# Patient Record
Sex: FEMALE | Race: WHITE | NOT HISPANIC OR LATINO | Employment: PART TIME | URBAN - METROPOLITAN AREA
[De-identification: names, ages, dates, MRNs, and addresses within clinical notes are randomized per-mention and may not be internally consistent; named-entity substitution may affect disease eponyms.]

---

## 2017-06-21 ENCOUNTER — RADIANT APPOINTMENT (OUTPATIENT)
Dept: MAMMOGRAPHY | Facility: CLINIC | Age: 42
End: 2017-06-21
Payer: COMMERCIAL

## 2017-06-21 ENCOUNTER — OFFICE VISIT (OUTPATIENT)
Dept: OBGYN | Facility: CLINIC | Age: 42
End: 2017-06-21
Payer: COMMERCIAL

## 2017-06-21 VITALS
HEIGHT: 63 IN | WEIGHT: 159 LBS | DIASTOLIC BLOOD PRESSURE: 70 MMHG | BODY MASS INDEX: 28.17 KG/M2 | SYSTOLIC BLOOD PRESSURE: 110 MMHG

## 2017-06-21 DIAGNOSIS — Z01.419 ENCOUNTER FOR GYNECOLOGICAL EXAMINATION WITHOUT ABNORMAL FINDING: Primary | ICD-10-CM

## 2017-06-21 DIAGNOSIS — E78.5 HYPERLIPIDEMIA, UNSPECIFIED HYPERLIPIDEMIA TYPE: ICD-10-CM

## 2017-06-21 DIAGNOSIS — M12.80 HLA-B27 POSITIVE ARTHROPATHY: ICD-10-CM

## 2017-06-21 DIAGNOSIS — M47.9 OSTEOARTHRITIS OF SPINE, UNSPECIFIED SPINAL OSTEOARTHRITIS COMPLICATION STATUS, UNSPECIFIED SPINAL REGION: ICD-10-CM

## 2017-06-21 DIAGNOSIS — N39.3 STRESS INCONTINENCE IN FEMALE: ICD-10-CM

## 2017-06-21 DIAGNOSIS — Z30.41 USES ORAL CONTRACEPTION: ICD-10-CM

## 2017-06-21 DIAGNOSIS — Z12.31 VISIT FOR SCREENING MAMMOGRAM: ICD-10-CM

## 2017-06-21 PROCEDURE — 77063 BREAST TOMOSYNTHESIS BI: CPT | Mod: TC

## 2017-06-21 PROCEDURE — G0202 SCR MAMMO BI INCL CAD: HCPCS | Mod: TC

## 2017-06-21 PROCEDURE — 99396 PREV VISIT EST AGE 40-64: CPT | Performed by: OBSTETRICS & GYNECOLOGY

## 2017-06-21 RX ORDER — NORETHINDRONE ACETATE AND ETHINYL ESTRADIOL 1.5-30(21)
1 KIT ORAL DAILY
Qty: 84 TABLET | Refills: 4 | Status: SHIPPED | OUTPATIENT
Start: 2017-06-21 | End: 2017-12-08

## 2017-06-21 ASSESSMENT — ANXIETY QUESTIONNAIRES
IF YOU CHECKED OFF ANY PROBLEMS ON THIS QUESTIONNAIRE, HOW DIFFICULT HAVE THESE PROBLEMS MADE IT FOR YOU TO DO YOUR WORK, TAKE CARE OF THINGS AT HOME, OR GET ALONG WITH OTHER PEOPLE: NOT DIFFICULT AT ALL
1. FEELING NERVOUS, ANXIOUS, OR ON EDGE: NOT AT ALL
7. FEELING AFRAID AS IF SOMETHING AWFUL MIGHT HAPPEN: NOT AT ALL
2. NOT BEING ABLE TO STOP OR CONTROL WORRYING: NOT AT ALL
6. BECOMING EASILY ANNOYED OR IRRITABLE: NOT AT ALL
3. WORRYING TOO MUCH ABOUT DIFFERENT THINGS: NOT AT ALL
5. BEING SO RESTLESS THAT IT IS HARD TO SIT STILL: NOT AT ALL
GAD7 TOTAL SCORE: 0

## 2017-06-21 ASSESSMENT — PATIENT HEALTH QUESTIONNAIRE - PHQ9: 5. POOR APPETITE OR OVEREATING: NOT AT ALL

## 2017-06-21 NOTE — PROGRESS NOTES
Eliza is a 42 year old  female who presents for annual exam.     Besides routine health maintenance,  she would like to discuss iud.    HPI:  The patient's PCP is Anita Ortez    Doing well this year. Still deals with a lot of joint and muscle pain from her autoimmune arthritis, OA and spondylosis. Finally got on a program through a chiropractor office and has lost 15# and doing some sort of supplement drop and feels much better. Now just more watchful of diet and portions. Doing a lot of yoga the last 2 weeks and loving it. Was doing cardio and other exercise but then she is more sore. Yoga seems to be best for her. Sees rheum for that. Rheum has wanted her to take biologics for years but she declines at this time so now don't discuss it/push it with her.  Still seeing her PCP for her lipids and other general medical issues/labs/etc  ocps for years and no issues/side effects/ barely every gets anything during sugar pills. Maybe one wipe after bathroom that has brown smudge every few months.  won't do vas. Sick of taking an ocp. Wants to consider IUD  Kids are doing great. Home with her part time and then part time with a HS grad . Doing lots of camps, etc.  Has had MECHE for years. Can't jump on trampoline or cough/sneeze leakage. Wondering about finally doing something about it.   with new job. Old insurance through this month and 3D mammo not covered and old deductible. Vs waiting until august with new insurance and then 3D can go toward deductible. Wondering if should delay      GYNECOLOGIC HISTORY:    Patient's last menstrual period was 2017 (exact date).  Her current contraception method is: none.  She  reports that she has never smoked. She does not have any smokeless tobacco history on file.    Patient is sexually active.  STD testing offered?  Declined  Last PHQ-9 score on record =   PHQ-9 SCORE 2017   Total Score 0     Last GAD7 score on record =   RADHA-7 SCORE  2017   Total Score 0     Alcohol Score = 2    HEALTH MAINTENANCE:  Cholesterol: PCP does this every year  Last Mammo: 6/15/16, Result: normal, Next Mammo: 2017   Pap: 14 Neg, Hpv Neg  Colonoscopy:  never, Next Colonoscopy: age 52.  Dexa:  never    Health maintenance updated:  yes    HISTORY:  Obstetric History       T2      L2     SAB0   TAB0   Ectopic0   Multiple0   Live Births0       # Outcome Date GA Lbr Naresh/2nd Weight Sex Delivery Anes PTL Lv   2 Term 11/01/10 39w0d  7 lb 8 oz (3.402 kg) F Vag-Spont EPI  PITO      Name: Freddie   1 Term 08 39w0d  7 lb 10 oz (3.459 kg) M Vag-Spont EPI  PITO      Name: Gerard          Patient Active Problem List   Diagnosis     Osteoarthritis of spine, unspecified spinal osteoarthritis complication status, unspecified spinal region     HLA-B27 positive arthropathy     Uses oral contraception     Hyperlipidemia, unspecified hyperlipidemia type     Stress incontinence in female     Past Surgical History:   Procedure Laterality Date     Bunion surgery        Social History   Substance Use Topics     Smoking status: Never Smoker     Smokeless tobacco: Not on file     Alcohol use Not on file      Problem (# of Occurrences) Relation (Name,Age of Onset)    Breast Cancer (2) Mother (52): mom's dx'd at early stage, Paternal Grandmother    Coronary Artery Disease (1) Paternal Grandmother    DIABETES (1) Paternal Grandmother    Hyperlipidemia (1) Father    OSTEOPOROSIS (1) Maternal Grandmother    Thyroid Disease (1) Mother            Current Outpatient Prescriptions   Medication Sig     norethindrone-ethinyl estradiol-iron (MICROGESTIN FE 1.5/30) 1.5-30 MG-MCG per tablet Take 1 tablet by mouth daily     naproxen sodium (ANAPROX) 550 MG tablet Take by mouth 2 times daily (with meals)     simvastatin (ZOCOR) 40 MG tablet Take 1 tablet (40 mg) by mouth At Bedtime     [DISCONTINUED] norethindrone-ethinyl estradiol-iron (MICROGESTIN FE 1.5/30) 1.5-30 MG-MCG  "per tablet Take 1 tablet by mouth daily     No current facility-administered medications for this visit.      No Known Allergies    Past medical, surgical, social and family histories were reviewed and updated in EPIC.    ROS:   12 point review of systems negative other than symptoms noted below.  Genitourinary: Irregular Menses  Musculoskeletal: Joint Pain    EXAM:  /70  Ht 5' 3\" (1.6 m)  Wt 159 lb (72.1 kg)  LMP 04/20/2017 (Exact Date)  BMI 28.17 kg/m2   BMI: Body mass index is 28.17 kg/(m^2).    PHYSICAL EXAM:  Constitutional:  Appearance: Well nourished, well developed, alert, in no acute distress  Neck:  Lymph Nodes:  No lymphadenopathy present    Thyroid:  Gland size normal, nontender, no nodules or masses present  on palpation  Chest:  Respiratory Effort:  Breathing unlabored  Cardiovascular:    Heart: Auscultation:  Regular rate, normal rhythm, no murmurs present  Breasts: Inspection of Breasts:  No lymphadenopathy present    Palpation of Breasts and Axillae:  No masses present on palpation, no  breast tenderness    Axillary Lymph Nodes:  No lymphadenopathy present  Gastrointestinal:   Abdominal Examination:  Abdomen nontender to palpation, tone normal without rigidity or guarding, no masses present, umbilicus without lesions   Liver and Spleen:  No hepatomegaly present, liver nontender to palpation    Hernias:  No hernias present  Lymphatic: Lymph Nodes:  No other lymphadenopathy present  Skin:  General Inspection:  No rashes present, no lesions present, no areas of  discoloration    Genitalia and Groin:  No rashes present, no lesions present, no areas of  discoloration, no masses present  Neurologic/Psychiatric:    Mental Status:  Oriented X3     Pelvic Exam:  External Genitalia:     Normal appearance for age, no discharge present, no tenderness present, no inflammatory lesions present, color normal  Vagina:     Normal vaginal vault without central or paravaginal defects, no discharge present, no " inflammatory lesions present, no masses present  Bladder:     Nontender to palpation  Urethra:   Urethral Body:  Urethra palpation normal, urethra structural support normal   Urethral Meatus:  No erythema or lesions present  Cervix:     Appearance healthy, no lesions present, nontender to palpation, no bleeding present  Uterus:     Uterus: firm, normal sized and nontender, anteverted in position.   Adnexa:     No adnexal tenderness present, no adnexal masses present  Perineum:     Perineum within normal limits, no evidence of trauma, no rashes or skin lesions present  Anus:     Anus within normal limits, no hemorrhoids present  Inguinal Lymph Nodes:     No lymphadenopathy present  Pubic Hair:     Normal pubic hair distribution for age  Genitalia and Groin:     No rashes present, no lesions present, no areas of discoloration, no masses present    COUNSELING:   Reviewed preventive health counseling, as reflected in patient instructions  Special attention given to:        Contraception    BMI: Body mass index is 28.17 kg/(m^2).  Weight management plan: Patient was referred to their PCP to discuss a diet and exercise plan.    ASSESSMENT:  42 year old female with satisfactory annual exam.    ICD-10-CM    1. Encounter for gynecological examination without abnormal finding Z01.419    2. Uses oral contraception Z30.41 norethindrone-ethinyl estradiol-iron (MICROGESTIN FE 1.5/30) 1.5-30 MG-MCG per tablet   3. Stress incontinence in female N39.3    4. Osteoarthritis of spine, unspecified spinal osteoarthritis complication status, unspecified spinal region M47.9    5. HLA-B27 positive arthropathy M12.80    6. Hyperlipidemia, unspecified hyperlipidemia type E78.5        PLAN:  Pap is UTD for 2 more years.  Got her in for a mammo today and will do 3D. B/c her new insurance doesn't cover 3D either the cost difference won't apply to deductible anyway so would rather just stay in sync with her mammo and our appointment  dicsussed  ocps vs mirena. ocps giving her near amenorrhea so likely mirena would as well. Could even overlap the two for 2-3 months to help decrease initial DUB. Carefully discussed the hormonal benefits of ocp vs mirena, PMS, acne, mood, etc. Patient thinks she'd still like to do it for ease and will return in august for that.  Discussed MECHE. Likely would benefit from sling. Would return for urodynamics testing first and then could plan for near future after that.    Monica Thompson MD

## 2017-06-21 NOTE — MR AVS SNAPSHOT
"              After Visit Summary   6/21/2017    Eliza Nascimento    MRN: [id number]           Patient Information     Date Of Birth          1975        Visit Information        Provider Department      6/21/2017 10:20 AM Monica Thompson MD [facility]        Today's Diagnoses     Encounter for gynecological examination without abnormal finding    -  1    Uses oral contraception        Stress incontinence in female        Osteoarthritis of spine, unspecified spinal osteoarthritis complication status, unspecified spinal region        HLA-B27 positive arthropathy        Hyperlipidemia, unspecified hyperlipidemia type           Follow-ups after your visit        Who to contact     If you have questions or need follow up information about today's clinic visit or your schedule please contact [facility] directly at [phone].  Normal or non-critical lab and imaging results will be communicated to you by Vibliohart, letter or phone within 4 business days after the clinic has received the results. If you do not hear from us within 7 days, please contact the clinic through Vibliohart or phone. If you have a critical or abnormal lab result, we will notify you by phone as soon as possible.  Submit refill requests through Vinny or call your pharmacy and they will forward the refill request to us. Please allow 3 business days for your refill to be completed.          Additional Information About Your Visit        MyChart Information     Vinny lets you send messages to your doctor, view your test results, renew your prescriptions, schedule appointments and more. To sign up, go to [email or web address] . Click on \"Log in\" on the left side of the screen, which will take you to the Welcome page. Then click on \"Sign up Now\" on the right side of the page.     You will be asked to enter the access code listed below, as well as some personal information. Please follow the directions to " "create your username and password.     Your access code is: [email or web address]  Expires: 2017  7:00 PM     Your access code will  in 90 days. If you need help or a new code, please call your [facility] clinic or [phone].        Care EveryWhere ID     This is your Care EveryWhere ID. This could be used by other organizations to access your [facility] medical records  [id number]        Your Vitals Were     Height Last Period BMI (Body Mass Index)             5' 3\" (1.6 m) 2017 (Exact Date) 28.17 kg/m2          Blood Pressure from Last 3 Encounters:   17 110/70   06/15/16 126/78   06/10/15 122/70    Weight from Last 3 Encounters:   17 159 lb (72.1 kg)   06/15/16 176 lb (79.8 kg)   06/10/15 173 lb (78.5 kg)              Today, you had the following     No orders found for display         Where to get your medicines      These medications were sent to [email or web address] [address] [place] [address] [address]  AT [facility] [address]  [address] [facility] [address]     Phone:  [phone]     norethindrone-ethinyl estradiol-iron 1.5-30 MG-MCG per tablet          Primary Care Provider    None Specified       No primary provider on file.        Equal Access to Services     ANA LOYOLA AH: Hadii rabia hlaeyo Socarlos, waaxda luqadaha, qaybta kaalmada serina macedo. So [facility] [phone].    ATENCIÓN: Si habla español, tiene a purcell disposición servicios gratuitos de asistencia lingüística. Llame al [phone].    We comply with applicable federal civil rights laws and [place] laws. We do not discriminate on the basis of race, color, national origin, age, disability sex, sexual orientation or gender identity.            Thank you!     Thank you for choosing [facility]  for your care. Our goal is always to provide you with excellent care. Hearing back from our patients is one way we can continue to improve our services. " Please take a few minutes to complete the written survey that you may receive in the mail after your visit with us. Thank you!             Your Updated Medication List - Protect others around you: Learn how to safely use, store and throw away your medicines at www.disposemymeds.org.          This list is accurate as of: 6/21/17  7:00 PM.  Always use your most recent med list.                   Brand Name Dispense Instructions for use Diagnosis    naproxen sodium 550 MG tablet    ANAPROX    60 tablet    Take by mouth 2 times daily (with meals)        norethindrone-ethinyl estradiol-iron 1.5-30 MG-MCG per tablet    MICROGESTIN FE 1.5/30    84 tablet    Take 1 tablet by mouth daily    Uses oral contraception       simvastatin 40 MG tablet    ZOCOR    90 tablet    Take 1 tablet (40 mg) by mouth At Bedtime

## 2017-06-22 ASSESSMENT — ANXIETY QUESTIONNAIRES: GAD7 TOTAL SCORE: 0

## 2017-06-22 ASSESSMENT — PATIENT HEALTH QUESTIONNAIRE - PHQ9: SUM OF ALL RESPONSES TO PHQ QUESTIONS 1-9: 0

## 2017-11-14 ENCOUNTER — TELEPHONE (OUTPATIENT)
Dept: NURSING | Facility: CLINIC | Age: 42
End: 2017-11-14

## 2017-11-14 NOTE — TELEPHONE ENCOUNTER
Pt just picked up her OCP, norethindrone-ethinyl estradiol-iron (MICROGESTIN FE 1.5/30) 1.5-30 MG-MCG per tablet, and it was different generic. Pt compared dose of new OCP with our records. Correct dose given to pt. Pt informed to call if she has any problems and we can send rx for YESSICA.

## 2017-12-08 ENCOUNTER — TELEPHONE (OUTPATIENT)
Dept: NURSING | Facility: CLINIC | Age: 42
End: 2017-12-08

## 2017-12-08 DIAGNOSIS — Z30.41 USES ORAL CONTRACEPTION: ICD-10-CM

## 2017-12-08 RX ORDER — NORETHINDRONE ACETATE AND ETHINYL ESTRADIOL AND FERROUS FUMARATE 1.5-30(21)
1 KIT ORAL DAILY
Qty: 84 TABLET | Refills: 1 | Status: SHIPPED | OUTPATIENT
Start: 2017-12-08 | End: 2017-12-08

## 2017-12-08 RX ORDER — NORETHINDRONE ACETATE AND ETHINYL ESTRADIOL AND FERROUS FUMARATE 1.5-30(21)
1 KIT ORAL DAILY
Qty: 84 TABLET | Refills: 1 | Status: SHIPPED | OUTPATIENT
Start: 2017-12-08 | End: 2018-07-25

## 2017-12-08 NOTE — TELEPHONE ENCOUNTER
We can just do YESSICA either way. May cost her more. Sometimes it's jst a matter of finding a different pharmacy b/c it's all about who they have contracts with. Try YESSICA at her same pharm. If that doesn't work will have to shop around to other places and see who carries her original brand.

## 2018-06-20 ENCOUNTER — TELEPHONE (OUTPATIENT)
Dept: OBGYN | Facility: CLINIC | Age: 43
End: 2018-06-20

## 2018-07-25 ENCOUNTER — RADIANT APPOINTMENT (OUTPATIENT)
Dept: MAMMOGRAPHY | Facility: CLINIC | Age: 43
End: 2018-07-25
Attending: OBSTETRICS & GYNECOLOGY
Payer: COMMERCIAL

## 2018-07-25 ENCOUNTER — OFFICE VISIT (OUTPATIENT)
Dept: OBGYN | Facility: CLINIC | Age: 43
End: 2018-07-25
Attending: OBSTETRICS & GYNECOLOGY
Payer: COMMERCIAL

## 2018-07-25 VITALS
HEART RATE: 80 BPM | DIASTOLIC BLOOD PRESSURE: 70 MMHG | BODY MASS INDEX: 30.05 KG/M2 | HEIGHT: 63 IN | SYSTOLIC BLOOD PRESSURE: 104 MMHG | WEIGHT: 169.6 LBS

## 2018-07-25 DIAGNOSIS — Z30.41 USES ORAL CONTRACEPTION: ICD-10-CM

## 2018-07-25 DIAGNOSIS — N39.3 STRESS INCONTINENCE IN FEMALE: ICD-10-CM

## 2018-07-25 DIAGNOSIS — Z01.419 ENCOUNTER FOR GYNECOLOGICAL EXAMINATION WITHOUT ABNORMAL FINDING: Primary | ICD-10-CM

## 2018-07-25 DIAGNOSIS — Z12.31 VISIT FOR SCREENING MAMMOGRAM: ICD-10-CM

## 2018-07-25 DIAGNOSIS — E78.5 HYPERLIPIDEMIA, UNSPECIFIED HYPERLIPIDEMIA TYPE: ICD-10-CM

## 2018-07-25 DIAGNOSIS — M12.80 HLA-B27 POSITIVE ARTHROPATHY: ICD-10-CM

## 2018-07-25 PROCEDURE — 77067 SCR MAMMO BI INCL CAD: CPT | Mod: TC

## 2018-07-25 PROCEDURE — 99396 PREV VISIT EST AGE 40-64: CPT | Performed by: OBSTETRICS & GYNECOLOGY

## 2018-07-25 PROCEDURE — G0145 SCR C/V CYTO,THINLAYER,RESCR: HCPCS | Performed by: OBSTETRICS & GYNECOLOGY

## 2018-07-25 PROCEDURE — 87624 HPV HI-RISK TYP POOLED RSLT: CPT | Performed by: OBSTETRICS & GYNECOLOGY

## 2018-07-25 PROCEDURE — 77063 BREAST TOMOSYNTHESIS BI: CPT | Mod: TC

## 2018-07-25 RX ORDER — NORETHINDRONE ACETATE AND ETHINYL ESTRADIOL AND FERROUS FUMARATE 1.5-30(21)
1 KIT ORAL DAILY
Qty: 84 TABLET | Refills: 3 | Status: SHIPPED | OUTPATIENT
Start: 2018-07-25 | End: 2019-07-26

## 2018-07-25 ASSESSMENT — ANXIETY QUESTIONNAIRES
1. FEELING NERVOUS, ANXIOUS, OR ON EDGE: NOT AT ALL
3. WORRYING TOO MUCH ABOUT DIFFERENT THINGS: NOT AT ALL
6. BECOMING EASILY ANNOYED OR IRRITABLE: NOT AT ALL
7. FEELING AFRAID AS IF SOMETHING AWFUL MIGHT HAPPEN: NOT AT ALL
GAD7 TOTAL SCORE: 0
5. BEING SO RESTLESS THAT IT IS HARD TO SIT STILL: NOT AT ALL
IF YOU CHECKED OFF ANY PROBLEMS ON THIS QUESTIONNAIRE, HOW DIFFICULT HAVE THESE PROBLEMS MADE IT FOR YOU TO DO YOUR WORK, TAKE CARE OF THINGS AT HOME, OR GET ALONG WITH OTHER PEOPLE: NOT DIFFICULT AT ALL
2. NOT BEING ABLE TO STOP OR CONTROL WORRYING: NOT AT ALL

## 2018-07-25 ASSESSMENT — PATIENT HEALTH QUESTIONNAIRE - PHQ9: 5. POOR APPETITE OR OVEREATING: NOT AT ALL

## 2018-07-25 NOTE — PROGRESS NOTES
Eliza is a 43 year old  female who presents for annual exam.     Besides routine health maintenance, she has no other health concerns today .    HPI:  The patient's PCP is  [facility].      Doing fairly well. Periods are very light and regular on her ocps and has done them forever. Discussed making a change to a mirena IUD and she had wanted to do this but just never made the time for it. Still thinks about it but again just needs to find the time    Patient also still has MECHE with running, jumping, sneeze/cough. Was talking about doing uro-d and sling when last here but time just got away from her    Her arthritis waxes and wanes. Her rheum has wanted her to take biologics for years but she just has always worried about the side effects and so has managed her pain and dealt with it. Worry has been that if unchecked she can get permanent destruction of the joints which is why they don't want her to just manage but actually treat. Has never had imaging of her joints. When asked why she states that rheum doesn't believe that its useful as medications are recommended based on sx. Discussed that given that they rec treatment and she hasn't wanted it, that maybe doing joint imaging to see if any damage could change her mind on medications and she agrees taht this is possible. Will mention it when due to see them next.    [facility] still monitoring her cholesterol and statins    GYNECOLOGIC HISTORY:    Patient's last menstrual period was 2018 (exact date).  Her current contraception method is: oral contraceptives.  She  reports that she has never smoked. She has never used smokeless tobacco.    Patient is sexually active.  STD testing offered?  Declined  Last PHQ-9 score on record =   PHQ-9 SCORE 2018   Total Score 0     Last GAD7 score on record =   RADHA-7 SCORE 2018   Total Score 0     Alcohol Score = 3    HEALTH MAINTENANCE:  Cholesterol: (No results found for: CHOL  Followed by PCP  Last Mammo: 2017, Result: normal, Next Mammo: today   Pap:2014 Neg, HPV Neg  Colonoscopy:  NA, Result: not applicable, Next Colonoscopy: 9 years.  Dexa:  NA    Health maintenance updated:  yes    HISTORY:  Obstetric History       T2      L2     SAB0   TAB0   Ectopic0   Multiple0   Live Births2       # Outcome Date GA Lbr Naresh/2nd Weight Sex Delivery Anes PTL Lv   2 Term 11/01/10 39w0d  7 lb 8 oz (3.402 kg) F Vag-Spont EPI  PITO      Name: Freddie   1 Term 08 39w0d  7 lb 10 oz (3.459 kg) M Vag-Spont EPI  PITO      Name: Gerard          Patient Active Problem List   Diagnosis     Osteoarthritis of spine, unspecified spinal osteoarthritis complication status, unspecified spinal region     HLA-B27 positive arthropathy     Uses oral contraception     Hyperlipidemia, unspecified hyperlipidemia type     Stress incontinence in female     Past Surgical History:   Procedure Laterality Date     Bunion surgery        Social History   Substance Use Topics     Smoking status: Never Smoker     Smokeless tobacco: Never Used     Alcohol use 0.0 oz/week     0 Standard drinks or equivalent per week      Problem (# of Occurrences) Relation (Name,Age of Onset)    Breast Cancer (2) Mother (52): mom's dx'd at early stage, Paternal Grandmother    Coronary Artery Disease (1) Paternal Grandmother    Diabetes (1) Paternal Grandmother    Hyperlipidemia (1) Father    Osteoperosis (1) Maternal Grandmother    Thyroid Disease (1) Mother            Current Outpatient Prescriptions   Medication Sig     MICROGESTIN FE 1.5/30 1.5-30 MG-MCG per tablet Take 1 tablet by mouth daily     naproxen sodium (ANAPROX) 550 MG tablet Take by mouth 2 times daily (with meals)     simvastatin (ZOCOR) 40 MG tablet Take 1 tablet (40 mg) by mouth At Bedtime     No current facility-administered medications for this visit.      No Known Allergies    Past medical, surgical, social and family histories were reviewed and updated  "in EPIC.    ROS:   12 point review of systems negative other than symptoms noted below.  Head: Ringing  Cardiovascular: Palpitations  Musculoskeletal: Joint Pain  Psychiatric: Difficulty Sleeping    EXAM:  /70  Pulse 80  Ht 5' 3\" (1.6 m)  Wt 169 lb 9.6 oz (76.9 kg)  LMP 07/18/2018 (Exact Date)  Breastfeeding? No  BMI 30.04 kg/m2   BMI: Body mass index is 30.04 kg/(m^2).    PHYSICAL EXAM:  Constitutional:  Appearance: Well nourished, well developed, alert, in no acute distress  Neck:  Lymph Nodes:  No lymphadenopathy present    Thyroid:  Gland size normal, nontender, no nodules or masses present  on palpation  Chest:  Respiratory Effort:  Breathing unlabored  Cardiovascular:    Heart: Auscultation:  Regular rate, normal rhythm, no murmurs present  Breasts: Palpation of Breasts and Axillae:  No masses present on palpation, no breast tenderness. and No nodularity, asymmetry or nipple discharge bilaterally.  Gastrointestinal:   Abdominal Examination:  Abdomen nontender to palpation, tone normal without rigidity or guarding, no masses present, umbilicus without lesions   Liver and Spleen:  No hepatomegaly present, liver nontender to palpation    Hernias:  No hernias present  Lymphatic: Lymph Nodes:  No other lymphadenopathy present  Skin:  General Inspection:  No rashes present, no lesions present, no areas of  discoloration    Genitalia and Groin:  No rashes present, no lesions present, no areas of  discoloration, no masses present  Neurologic/Psychiatric:    Mental Status:  Oriented X3     Pelvic Exam:  External Genitalia:     Normal appearance for age, no discharge present, no tenderness present, no inflammatory lesions present, color normal  Vagina:     Normal vaginal vault without central or paravaginal defects, no discharge present, no inflammatory lesions present, no masses present  Bladder:     Nontender to palpation  Urethra:   Urethral Body:  Urethra palpation normal, urethra structural support " normal   Urethral Meatus:  No erythema or lesions present  Cervix:     Appearance healthy, no lesions present, nontender to palpation, no bleeding present  Uterus:     Uterus: firm, normal sized and nontender, anteverted in position.   Adnexa:     No adnexal tenderness present, no adnexal masses present  Perineum:     Perineum within normal limits, no evidence of trauma, no rashes or skin lesions present  Anus:     Anus within normal limits, no hemorrhoids present  Inguinal Lymph Nodes:     No lymphadenopathy present  Pubic Hair:     Normal pubic hair distribution for age  Genitalia and Groin:     No rashes present, no lesions present, no areas of discoloration, no masses present      COUNSELING:   Reviewed preventive health counseling, as reflected in patient instructions       stress incontinence       Contraception    BMI: Body mass index is 30.04 kg/(m^2).  Weight management plan: Discussed healthy diet and exercise guidelines and patient will follow up in 12 months in clinic to re-evaluate. Patient was referred to their PCP to discuss a diet and exercise plan.    ASSESSMENT:  43 year old female with satisfactory annual exam.    ICD-10-CM    1. Encounter for gynecological examination without abnormal finding Z01.419 Pap imaged thin layer screen with HPV - recommended age 30 - 65     HPV High Risk Types DNA Cervical   2. Uses oral contraception Z30.41 MICROGESTIN FE 1.5/30 1.5-30 MG-MCG per tablet   3. HLA-B27 positive arthropathy M12.80    4. Stress incontinence in female N39.3    5. Hyperlipidemia, unspecified hyperlipidemia type E78.5        PLAN:  Pap and cotesting done today  mammo today  Refill ocps for the year  The patient is still considering IUD just hasn't gotten around to it. Barely has any period on her ocps and is already taking her statin so in a habit of taking it on time. So not critical to make a change if she's fine with it but she thinks she really would like to do a mirena. Just has to make  the time for it  Her MECHE is still an issue and again just never made the time for doing uro-D or the sling. Son had ear tubes and will need them again so may hit deductible this year and then may pursue that and the IUD  Continue to manage labs and statin meds with her PCP and her arthritis with rheum    Monica Thompson MD

## 2018-07-25 NOTE — LETTER
August 2, 2018    Eliza MALIK [place]  [address] [facility] [address]    Dear Eliza,  We are happy to inform you that your PAP smear result from 7/25/18 is normal.  We are now able to do a follow up test on PAP smears. The DNA test is for HPV (Human Papilloma Virus). Cervical cancer is closely linked with certain types of HPV. Your results showed no evidence of high risk HPV.  Therefore we recommend you return in 5 years for your next pap smear and HPV test.  You will still need to return to the clinic every year for an annual exam and other preventive tests.  Please contact the clinic at [phone] with any questions.  Sincerely,    Monica Thompson MD/keri

## 2018-07-25 NOTE — MR AVS SNAPSHOT
"              After Visit Summary   7/25/2018    Eliza Nascimento    MRN: [id number]           Patient Information     Date Of Birth          1975        Visit Information        Provider Department      7/25/2018 1:00 PM Monica Thompson MD [facility]        Today's Diagnoses     Encounter for gynecological examination without abnormal finding    -  1    Uses oral contraception        HLA-B27 positive arthropathy        Stress incontinence in female        Hyperlipidemia, unspecified hyperlipidemia type           Follow-ups after your visit        Your next 10 appointments already scheduled     Jul 26, 2019  8:00 AM CDT   MA SCREENING DIGITAL BILATERAL with WEMA1   [facility] ([facility])    [address], [address]  [facility] [address] [phone]           Do not use any powder, lotion or deodorant under your arms or on your breast. If you do, we will ask you to remove it before your exam.  Wear comfortable, two-piece clothing.  If you have any allergies, tell your care team.  Bring any previous mammograms from other facilities or have them mailed to the breast center. Three-dimensional (3D) mammograms are available at [place] locations in [facility], [facility], [place], [place], and [place]. [facility] locations include [place] and [facility] in [place]. Benefits of 3D mammograms include: - Improved rate of cancer detection - Decreases your chance of having to go back for more tests, which means fewer: - \"False-positive\" results (This means that there is an abnormal area but it isn't cancer.) - Invasive testing procedures, such as a biopsy or surgery - Can provide clearer images of the breast if you have dense breast tissue. 3D mammography is an optional exam that anyone can have with a 2D mammogram. It doesn't replace or take the place of a 2D mammogram. 2D mammograms remain an " "effective screening test for all women.  Not all insurance companies cover the cost of a 3D mammogram. Check with your insurance.            Jul 26, 2019  8:30 AM CDT   PHYSICAL with Monica Thompson MD   [facility] [place] ([facility] [place])    [address] [place] [address]  [place] [id number] [phone]              Who to contact     If you have questions or need follow up information about today's clinic visit or your schedule please contact [facility] directly at [phone].  Normal or non-critical lab and imaging results will be communicated to you by [employer], letter or phone within 4 business days after the clinic has received the results. If you do not hear from us within 7 days, please contact the clinic through [employer] or phone. If you have a critical or abnormal lab result, we will notify you by phone as soon as possible.  Submit refill requests through [employer] or call your pharmacy and they will forward the refill request to us. Please allow 3 business days for your refill to be completed.          Additional Information About Your Visit        [facility] Information     [employer] gives you secure access to your electronic health record. If you see a primary care provider, you can also send messages to your care team and make appointments. If you have questions, please call your primary care clinic.  If you do not have a primary care provider, please call [phone] and they will assist you.        Care EveryWhere ID     This is your Care EveryWhere ID. This could be used by other organizations to access your [place] medical records  [id number]        Your Vitals Were     Pulse Height Last Period Breastfeeding? BMI (Body Mass Index)       80 5' 3\" (1.6 m) 07/18/2018 (Exact Date) No 30.04 kg/m2        Blood Pressure from Last 3 Encounters:   07/25/18 104/70   06/21/17 110/70   06/15/16 126/78    Weight from Last 3 Encounters:   07/25/18 169 lb " 9.6 oz (76.9 kg)   06/21/17 159 lb (72.1 kg)   06/15/16 176 lb (79.8 kg)              We Performed the Following     HPV High Risk Types DNA Cervical     Pap imaged thin layer screen with HPV - recommended age 30 - 65          Where to get your medicines      These medications were sent to [id number] - [place], [place] - [address] AT [facility] [address]  [address], [facility] [address]     Phone:  [phone]     MICROGESTIN FE 1.5/30 1.5-30 MG-MCG per tablet          Primary Care Provider Fax #    [facility] [phone] [address] [place] [address]        Equal Access to Services     ANA LOYOLA : Narcisa ferrara Socarlos, waaxda luqadaha, qaybta kaalmada adepurviyashobha, serina dillard. So [facility] [phone].    ATENCIÓN: Si habla español, tiene a purcell disposición servicios gratuitos de asistencia lingüística. [facility] [phone].    We comply with applicable federal civil rights laws and [place] laws. We do not discriminate on the basis of race, color, national origin, age, disability, sex, sexual orientation, or gender identity.            Thank you!     Thank you for choosing [facility]  for your care. Our goal is always to provide you with excellent care. Hearing back from our patients is one way we can continue to improve our services. Please take a few minutes to complete the written survey that you may receive in the mail after your visit with us. Thank you!             Your Updated Medication List - Protect others around you: Learn how to safely use, store and throw away your medicines at www.disposemymeds.org.          This list is accurate as of 7/25/18 11:59 PM.  Always use your most recent med list.                   Brand Name Dispense Instructions for use Diagnosis    MICROGESTIN FE 1.5/30 1.5-30 MG-MCG per tablet   Generic drug:  norethindrone-ethinyl estradiol-iron     84 tablet     Take 1 tablet by mouth daily    Uses oral contraception       naproxen sodium 550 MG tablet    ANAPROX    60 tablet    Take by mouth 2 times daily (with meals)        simvastatin 40 MG tablet    ZOCOR    90 tablet    Take 1 tablet (40 mg) by mouth At Bedtime

## 2018-07-26 ASSESSMENT — PATIENT HEALTH QUESTIONNAIRE - PHQ9: SUM OF ALL RESPONSES TO PHQ QUESTIONS 1-9: 0

## 2018-07-26 ASSESSMENT — ANXIETY QUESTIONNAIRES: GAD7 TOTAL SCORE: 0

## 2018-07-27 LAB
COPATH REPORT: NORMAL
PAP: NORMAL

## 2018-07-30 LAB
FINAL DIAGNOSIS: NORMAL
HPV HR 12 DNA CVX QL NAA+PROBE: NEGATIVE
HPV16 DNA SPEC QL NAA+PROBE: NEGATIVE
HPV18 DNA SPEC QL NAA+PROBE: NEGATIVE
SPECIMEN DESCRIPTION: NORMAL
SPECIMEN SOURCE CVX/VAG CYTO: NORMAL

## 2019-07-25 NOTE — PROGRESS NOTES
Eliza is a 44 year old  female who presents for annual exam.     Besides routine health maintenance, she has no other health concerns today .    HPI:  The patient's PCP is  [facility].    Patient is seeing her PCP for her labs and meds. Has been on zocor for a long time but let her rx lapse for a year and just forgot to take it. Went and saw them last month and cholesterol was back up to LDL of 170s. So just restarted back on her statin and taking it consistently now.    Patient has been doing ocps for years. Periods are very light and very regular and no cramps and no side effects. Doesn't need more than a liner. When she did have heavier periods she couldn't use tampons any more b/c they'd slip out after having had kids. Her  is finally having a vasectomy and wondering what she should do about her ocps. Wondering about how to d/c them if chooses that or if could just stay on them.    Her arthritis is stable. Will have flares for no reason and sometimes will do more work of some sort, like weeding, and then her hands hurt for a long time. Rheum has been telling her to do biologics for a long time but just hasn't wanted to do that if can still function normally every day and not have it interfere in her life. Worries sometimes that she could be causing joint damage that then isn't fixable but for now wants to hold off    Gerard is 11 and [place] is 8      GYNECOLOGIC HISTORY:    Patient's last menstrual period was 2019.  Her current contraception method is: oral contraceptives.  She  reports that she has never smoked. She has never used smokeless tobacco.    Patient is sexually active.  STD testing offered?  Declined  Last PHQ-9 score on record =   PHQ-9 SCORE 2019   PHQ-9 Total Score 0     Last GAD7 score on record =   RADHA-7 SCORE 2019   Total Score 0     Alcohol Score = 2    HEALTH MAINTENANCE:  Cholesterol:2019 TOTAL 235, , HDL 44, GLUCOSE 89, TSH  1.0  Last Mammo: one year ago, Result: normal, Next Mammo: today   Pap: (  Lab Results   Component Value Date    PAP NIL HPV-  2018      Colonoscopy:  Never, Result: not applicable, Next Colonoscopy: due age 52 years.  Dexa:  NA    Health maintenance updated:  yes    HISTORY:  OB History    Para Term  AB Living   2 2 2 0 0 2   SAB TAB Ectopic Multiple Live Births   0 0 0 0 2      # Outcome Date GA Lbr Naresh/2nd Weight Sex Delivery Anes PTL Lv   2 Term 11/01/10 39w0d  3.402 kg (7 lb 8 oz) F Vag-Spont EPI  PITO      Birth Comments: Followed by Donna      Name: Freddie   1 Term 08 39w0d  3.459 kg (7 lb 10 oz) M Vag-Spont EPI  PITO      Birth Comments: Followed by Donna and Delivered by Leah. severe back issues during pregnancy-SI joint injection with minimal relief done at 36 weeks      Name: Gerard       Patient Active Problem List   Diagnosis     Osteoarthritis of spine, unspecified spinal osteoarthritis complication status, unspecified spinal region     HLA-B27 positive arthropathy     Uses oral contraception     Hyperlipidemia, unspecified hyperlipidemia type     Stress incontinence in female     Past Surgical History:   Procedure Laterality Date     Bunion surgery        Social History     Tobacco Use     Smoking status: Never Smoker     Smokeless tobacco: Never Used   Substance Use Topics     Alcohol use: Yes     Alcohol/week: 0.0 oz      Problem (# of Occurrences) Relation (Name,Age of Onset)    Breast Cancer (2) Mother (52): mom's dx'd at early stage, Paternal Grandmother    Coronary Artery Disease (1) Paternal Grandmother    Diabetes (1) Paternal Grandmother    Hyperlipidemia (1) Father    Osteoporosis (1) Maternal Grandmother    Thyroid Disease (1) Mother            Current Outpatient Medications   Medication Sig     MICROGESTIN FE 1.530 1.5-30 MG-MCG tablet Take 1 tablet by mouth daily     naproxen sodium (ANAPROX) 550 MG tablet Take by mouth 2 times daily (with meals)      "simvastatin (ZOCOR) 40 MG tablet Take 1 tablet (40 mg) by mouth At Bedtime     No current facility-administered medications for this visit.      No Known Allergies    Past medical, surgical, social and family histories were reviewed and updated in EPIC.    ROS:   12 point review of systems negative other than symptoms noted below.  Head: Ringing  Cardiovascular: Palpitations  Gastrointestinal: Bloating  Musculoskeletal: Muscle Cramps    EXAM:  /62   Pulse 62   Ht 1.6 m (5' 3\")   Wt 80.3 kg (177 lb)   LMP 06/30/2019   BMI 31.35 kg/m     BMI: Body mass index is 31.35 kg/m .    PHYSICAL EXAM:  Constitutional:  Appearance: Well nourished, well developed, alert, in no acute distress  Neck:  Lymph Nodes:  No lymphadenopathy present    Thyroid:  Gland size normal, nontender, no nodules or masses present  on palpation  Chest:  Respiratory Effort:  Breathing unlabored  Cardiovascular:    Heart: Auscultation:  Regular rate, normal rhythm, no murmurs present  Breasts: Palpation of Breasts and Axillae:  No masses present on palpation, no breast tenderness., No nodularity, asymmetry or nipple discharge bilaterally. and SOME MORE BROAD DENSE TISSUE IN UPPER OUTER LEFT BREAST  Gastrointestinal:   Abdominal Examination:  Abdomen nontender to palpation, tone normal without rigidity or guarding, no masses present, umbilicus without lesions   Liver and Spleen:  No hepatomegaly present, liver nontender to palpation    Hernias:  No hernias present  Lymphatic: Lymph Nodes:  No other lymphadenopathy present  Skin:  General Inspection:  No rashes present, no lesions present, no areas of  discoloration    Genitalia and Groin:  No rashes present, no lesions present, no areas of  discoloration, no masses present  Neurologic/Psychiatric:    Mental Status:  Oriented X3     Pelvic Exam:  External Genitalia:     Normal appearance for age, no discharge present, no tenderness present, no inflammatory lesions present, color " normal  Vagina:     Normal vaginal vault without central or paravaginal defects, no discharge present, no inflammatory lesions present, no masses present  Bladder:     Nontender to palpation  Urethra:   Urethral Body:  Urethra palpation normal, urethra structural support normal   Urethral Meatus:  No erythema or lesions present  Cervix:     Appearance healthy, no lesions present, nontender to palpation, no bleeding present  Uterus:     Uterus: firm, normal sized and nontender, anteverted in position.   Adnexa:     No adnexal tenderness present, no adnexal masses present  Perineum:     Perineum within normal limits, no evidence of trauma, no rashes or skin lesions present  Anus:     Anus within normal limits, no hemorrhoids present  Inguinal Lymph Nodes:     No lymphadenopathy present  Pubic Hair:     Normal pubic hair distribution for age  Genitalia and Groin:     No rashes present, no lesions present, no areas of discoloration, no masses present      COUNSELING:   Reviewed preventive health counseling, as reflected in patient instructions  Special attention given to:        Regular exercise       Healthy diet/nutrition       Contraception    BMI: Body mass index is 31.35 kg/m .  Weight management plan: Patient was referred to their PCP to discuss a diet and exercise plan.    ASSESSMENT:  44 year old female with satisfactory annual exam.    ICD-10-CM    1. Encounter for gynecological examination without abnormal finding Z01.419    2. Uses oral contraception Z30.41 MICROGESTIN FE 1.5/30 1.5-30 MG-MCG tablet   3. Hyperlipidemia, unspecified hyperlipidemia type E78.5    4. HLA-B27 positive arthropathy M12.80        PLAN:  Pap is UTD for 4 more years  mammo today  Continue to manage her lipids with her PCP and strongly encouraged her to stick with her medications  Refilled her ocps for 3 more months. Once  gets his all clear after f/u she can stop ocps and see how she does. If periods are regular and not overly  heavy can stay off and if gets to where they are irregular/heavy/crampy can always restart on them for cycle control    Monica Thompson MD

## 2019-07-26 ENCOUNTER — OFFICE VISIT (OUTPATIENT)
Dept: OBGYN | Facility: CLINIC | Age: 44
End: 2019-07-26
Payer: COMMERCIAL

## 2019-07-26 ENCOUNTER — ANCILLARY PROCEDURE (OUTPATIENT)
Dept: MAMMOGRAPHY | Facility: CLINIC | Age: 44
End: 2019-07-26
Payer: COMMERCIAL

## 2019-07-26 VITALS
HEART RATE: 62 BPM | WEIGHT: 177 LBS | DIASTOLIC BLOOD PRESSURE: 62 MMHG | HEIGHT: 63 IN | SYSTOLIC BLOOD PRESSURE: 116 MMHG | BODY MASS INDEX: 31.36 KG/M2

## 2019-07-26 DIAGNOSIS — Z12.31 VISIT FOR SCREENING MAMMOGRAM: ICD-10-CM

## 2019-07-26 DIAGNOSIS — M12.80 HLA-B27 POSITIVE ARTHROPATHY: ICD-10-CM

## 2019-07-26 DIAGNOSIS — E78.5 HYPERLIPIDEMIA, UNSPECIFIED HYPERLIPIDEMIA TYPE: ICD-10-CM

## 2019-07-26 DIAGNOSIS — Z01.419 ENCOUNTER FOR GYNECOLOGICAL EXAMINATION WITHOUT ABNORMAL FINDING: Primary | ICD-10-CM

## 2019-07-26 DIAGNOSIS — Z30.41 USES ORAL CONTRACEPTION: ICD-10-CM

## 2019-07-26 PROCEDURE — 99396 PREV VISIT EST AGE 40-64: CPT | Performed by: OBSTETRICS & GYNECOLOGY

## 2019-07-26 PROCEDURE — 77063 BREAST TOMOSYNTHESIS BI: CPT | Mod: TC

## 2019-07-26 PROCEDURE — 77067 SCR MAMMO BI INCL CAD: CPT | Mod: TC

## 2019-07-26 RX ORDER — NORETHINDRONE ACETATE AND ETHINYL ESTRADIOL AND FERROUS FUMARATE 1.5-30(21)
1 KIT ORAL DAILY
Qty: 84 TABLET | Refills: 0 | Status: SHIPPED | OUTPATIENT
Start: 2019-07-26 | End: 2020-07-03

## 2019-07-26 ASSESSMENT — ANXIETY QUESTIONNAIRES
1. FEELING NERVOUS, ANXIOUS, OR ON EDGE: NOT AT ALL
GAD7 TOTAL SCORE: 0
IF YOU CHECKED OFF ANY PROBLEMS ON THIS QUESTIONNAIRE, HOW DIFFICULT HAVE THESE PROBLEMS MADE IT FOR YOU TO DO YOUR WORK, TAKE CARE OF THINGS AT HOME, OR GET ALONG WITH OTHER PEOPLE: NOT DIFFICULT AT ALL
2. NOT BEING ABLE TO STOP OR CONTROL WORRYING: NOT AT ALL
6. BECOMING EASILY ANNOYED OR IRRITABLE: NOT AT ALL
7. FEELING AFRAID AS IF SOMETHING AWFUL MIGHT HAPPEN: NOT AT ALL
5. BEING SO RESTLESS THAT IT IS HARD TO SIT STILL: NOT AT ALL
3. WORRYING TOO MUCH ABOUT DIFFERENT THINGS: NOT AT ALL

## 2019-07-26 ASSESSMENT — PATIENT HEALTH QUESTIONNAIRE - PHQ9
SUM OF ALL RESPONSES TO PHQ QUESTIONS 1-9: 0
5. POOR APPETITE OR OVEREATING: NOT AT ALL

## 2019-07-26 ASSESSMENT — MIFFLIN-ST. JEOR: SCORE: 1432

## 2019-07-27 ASSESSMENT — ANXIETY QUESTIONNAIRES: GAD7 TOTAL SCORE: 0

## 2019-11-07 ENCOUNTER — HEALTH MAINTENANCE LETTER (OUTPATIENT)
Age: 44
End: 2019-11-07

## 2020-02-12 ENCOUNTER — TELEPHONE (OUTPATIENT)
Dept: OBGYN | Facility: CLINIC | Age: 45
End: 2020-02-12

## 2020-02-12 NOTE — TELEPHONE ENCOUNTER
Patient has not taken her birth control pills since August. She is going out of town for spring break and should have her period at that time. She wants to know if she can start taking her left over bc pills continuously to avoid having her period while on vacation. Please call patient @ [phone].

## 2020-02-14 ENCOUNTER — TELEPHONE (OUTPATIENT)
Dept: OBGYN | Facility: CLINIC | Age: 45
End: 2020-02-14

## 2020-02-14 NOTE — TELEPHONE ENCOUNTER
Questions answered.Pt verbalized understanding, in agreement with plan, and voiced no further questions.  Martine Deleon RN on 2/14/2020 at 2:00 PM

## 2020-06-19 ENCOUNTER — TELEPHONE (OUTPATIENT)
Dept: OBGYN | Facility: CLINIC | Age: 45
End: 2020-06-19

## 2020-06-19 NOTE — TELEPHONE ENCOUNTER
Patient has appointment to discuss a bladder mesh with Dr. Thompson on 7/3. Patient wants to know what's required in terms of a Pre-Op,  the procedure itself, and how it's scheduled

## 2020-06-30 NOTE — PROGRESS NOTES
Eliza is a 45 year old  female who presents for annual exam.     Besides routine health maintenance,  she would like to discuss possible bladder mesh surgery. Patient is experiencing incontinence when she coughs, jumps, sneezes. She feels like it takes her a while to go and when she does she has some leakage when she stands up..    HPI:  The patient's PCP is  [facility].    Patient is actually due for her annual but made this appointment to discuss her incontinence when annuals weren't allowed.  Since had adequate time today to do her annual and incontinence discussion will do both  [facility] and her statin refills. Not fasting today to check that so will need to f/u with htem     got a vas so she stopped her ocps after years on them. Menses are regular and not problematic    Has had HLA arthropathy and spondylitis for years and has fought doing meds for it b/c wasn't that bothersome. Finally rheum convinced her she needed to. Did humira and did great with it and pain was much better but then early this year had transaminase bump and had to stop it. Now on cosentyx and def not as good. About to get her fourth shot and finish up her 3rd month. Usually takes 3 months to tell if it's working. 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    Biggest issue is MECHE. Leaks when cough, sneeze, laugh, run or jump and sometimes just when stands up after sitting. Sick of being self conscious, needing to wear pads all the time and cross her legs in advance.  Has met her deductible and has thought about this for a long time and thinks she'd like to do a sling.  Denies any urgency or freq or urge incontinence. No OI either. Sometimes will void and think she's done and then stands and has some trickling. Other times  if voids and sits for 15 sec more then a bit mor urine comes. Not hard to initiate stream, stream not spraying needing to valsalva to void at all      GYNECOLOGIC HISTORY:    Patient's last menstrual period was 2020 (exact date).    Regular menses? Yes    Her current contraception method is: vasectomy.  She  reports that she has never smoked. She has never used smokeless tobacco.    Patient is sexually active.  STD testing offered?  Declined  Last PHQ-9 score on record =   PHQ-9 SCORE 2019   PHQ-9 Total Score 0     Last GAD7 score on record =   RADHA-7 SCORE 2019   Total Score 0       HEALTH MAINTENANCE:  Cholesterol: with PCP   Last Mammo: One year ago, Result: Normal, Next Mammo: 2020  Pap:   Lab Results   Component Value Date    PAP NIL, HPV- 2018     Colonoscopy:  N/a, Result: Not applicable, Next Colonoscopy: 2 years.  Dexa:  N/a    Health maintenance updated:  yes,     HISTORY:  OB History    Para Term  AB Living   2 2 2 0 0 2   SAB TAB Ectopic Multiple Live Births   0 0 0 0 2      # Outcome Date GA Lbr Naresh/2nd Weight Sex Delivery Anes PTL Lv   2 Term 11/01/10 39w0d  3.402 kg (7 lb 8 oz) F Vag-Spont EPI  PITO      Birth Comments: Followed by Donna      Name: Freddie   1 Term 08 39w0d  3.459 kg (7 lb 10 oz) M Vag-Spont EPI  PITO      Birth Comments: Followed by Donna and Delivered by [facility]. severe back issues during pregnancy-SI joint injection with minimal relief done at 36 weeks      Name: Gerard       Patient Active Problem List   Diagnosis     Osteoarthritis of spine, unspecified spinal osteoarthritis complication status, unspecified spinal region     HLA-B27 positive arthropathy     Uses oral contraception     Hyperlipidemia, unspecified hyperlipidemia type     Stress incontinence in female     Past Surgical History:   Procedure Laterality Date     Bunion surgery        Social History     Tobacco Use     Smoking status: Never Smoker     Smokeless  "tobacco: Never Used   Substance Use Topics     Alcohol use: Yes     Alcohol/week: 0.0 standard drinks      Problem (# of Occurrences) Relation (Name,Age of Onset)    Breast Cancer (2) Mother (52): mom's dx'd at early stage, Paternal Grandmother    Coronary Artery Disease (1) Paternal Grandmother    Diabetes (1) Paternal Grandmother    Hyperlipidemia (1) Father    Osteoporosis (1) Maternal Grandmother    Thyroid Disease (1) Mother            Current Outpatient Medications   Medication Sig     ALPRAZolam (XANAX) 0.5 MG tablet Take 1 tablet (0.5 mg) by mouth once for 1 dose One hour before procedure     cephALEXin (KEFLEX) 500 MG capsule Take one capsule po the night before procedure and the AM of procedure     COSENTYX SENSOREADY  MG/ML Sensoready pen      oxyCODONE (ROXICODONE) 5 MG tablet Take 1 tablet (5 mg) by mouth once for 1 dose One hour before procedure     simvastatin (ZOCOR) 40 MG tablet Take 1 tablet (40 mg) by mouth At Bedtime     naproxen sodium (ANAPROX) 550 MG tablet Take by mouth 2 times daily (with meals)     No current facility-administered medications for this visit.      No Known Allergies    Past medical, surgical, social and family histories were reviewed and updated in EPIC.    ROS:   12 point review of systems negative other than symptoms noted below or in the HPI.  Genitourinary: Incontinence  stress incontinence, Negative for urinary frequency, dysuria, urgency, difficulty initiating urination, hematuria, nocturia x     EXAM:  /54   Ht 1.6 m (5' 3\")   Wt 80 kg (176 lb 6.4 oz)   LMP 06/06/2020 (Exact Date)   Breastfeeding No   BMI 31.25 kg/m     BMI: Body mass index is 31.25 kg/m .    PHYSICAL EXAM:  Constitutional:   Appearance: Well nourished, well developed, alert, in no acute distress  Neck:  Lymph Nodes:  No lymphadenopathy present    Thyroid:  Gland size normal, nontender, no nodules or masses present  on palpation  Chest:  Respiratory Effort:  Breathing " unlabored  Cardiovascular:    Heart: Auscultation:  Regular rate, normal rhythm, no murmurs present  Breasts: Palpation of Breasts and Axillae:  No masses present on palpation, no breast tenderness. and No nodularity, asymmetry or nipple discharge bilaterally.  Gastrointestinal:   Abdominal Examination:  Abdomen nontender to palpation, tone normal without rigidity or guarding, no masses present, umbilicus without lesions   Liver and Spleen:  No hepatomegaly present, liver nontender to palpation    Hernias:  No hernias present  Lymphatic: Lymph Nodes:  No other lymphadenopathy present  Skin:  General Inspection:  No rashes present, no lesions present, no areas of  discoloration  Neurologic:    Mental Status:  Oriented X3.  Normal strength and tone, sensory exam                grossly normal, mentation intact and speech normal.    Psychiatric:   Mentation appears normal and affect normal/bright.         Pelvic Exam:  External Genitalia:     Normal appearance for age, no discharge present, no tenderness present, no inflammatory lesions present, color normal  Vagina:     Normal vaginal vault without central or paravaginal defects, no discharge present, no inflammatory lesions present, no masses present  Bladder:     Nontender to palpation  Urethra:   Urethral Body:  Urethra palpation normal, urethra structural support normal   Urethral Meatus:  No erythema or lesions present  Cervix:     Appearance healthy, no lesions present, nontender to palpation, no bleeding present  Uterus:     Uterus: firm, normal sized and nontender, anteverted in position.   Adnexa:     No adnexal tenderness present, no adnexal masses present  Perineum:     Perineum within normal limits, no evidence of trauma, no rashes or skin lesions present  Anus:     Anus within normal limits, no hemorrhoids present  Inguinal Lymph Nodes:     No lymphadenopathy present  Pubic Hair:     Normal pubic hair distribution for age  Genitalia and Groin:     No  rashes present, no lesions present, no areas of discoloration, no masses present      COUNSELING:   Reviewed preventive health counseling, as reflected in patient instructions  Special attention given to:        MECHE/cystocele    BMI: Body mass index is 31.25 kg/m .  Weight management plan: Patient was referred to their PCP to discuss a diet and exercise plan.    ASSESSMENT:  45 year old female with satisfactory annual exam.    ICD-10-CM    1. Encounter for gynecological examination without abnormal finding  Z01.419    2. Stress incontinence in female  N39.3 cephALEXin (KEFLEX) 500 MG capsule     oxyCODONE (ROXICODONE) 5 MG tablet     ALPRAZolam (XANAX) 0.5 MG tablet   3. Hyperlipidemia, unspecified hyperlipidemia type  E78.5    4. HLA-B27 positive arthropathy  M12.80        PLAN:  Pap is UTD for 3 more years  Weren't able to get her a mammo today but encouraged to get It moved up from sept now that yearly was done and won't have to time it to include both and then can be more on track with each other going forward    Will f/u with her PCP regarding fasting labs and statin and her rheum for her cosentyx    Discussed her MECHE and very mild cystocele. Discussed the anatomy of each, what interventions are done  Discussed sling and its risks/benefits and expected improvements. Discussed ant repair as well  Patient's cystocele is quite mild so unlikely to benefit dramatically from repair and with her cosentyx is higher risk for infection with holcomb and longer incision, etc  Discussed sling with local and oxy/xanax vs local and MAC. Patient actually thinking about just local without even oxy/xanax so can drive herself. 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  She'd like to move forward with it.  Discussed doing uro-d as adding only whether she has some ISD or not. Sling still first  line intervention but if some ISD could need further intervention like macroplastique.  Patient is comfortable skipping uro-d and just doing the sling and if not complete resolution then can pursue that  Discussed transient increase in urgency, UI after sling but likely will resolve  Spent an additional 15 min, 100% of which was in face to face counseling, regarding the sling procedure in addition to the annual    Monica Thompson MD

## 2020-07-03 ENCOUNTER — PREP FOR PROCEDURE (OUTPATIENT)
Dept: OBGYN | Facility: CLINIC | Age: 45
End: 2020-07-03

## 2020-07-03 ENCOUNTER — OFFICE VISIT (OUTPATIENT)
Dept: OBGYN | Facility: CLINIC | Age: 45
End: 2020-07-03
Payer: COMMERCIAL

## 2020-07-03 VITALS
BODY MASS INDEX: 31.25 KG/M2 | HEIGHT: 63 IN | WEIGHT: 176.4 LBS | SYSTOLIC BLOOD PRESSURE: 112 MMHG | DIASTOLIC BLOOD PRESSURE: 54 MMHG

## 2020-07-03 DIAGNOSIS — N39.3 STRESS INCONTINENCE IN FEMALE: ICD-10-CM

## 2020-07-03 DIAGNOSIS — M12.80 HLA-B27 POSITIVE ARTHROPATHY: ICD-10-CM

## 2020-07-03 DIAGNOSIS — Z01.419 ENCOUNTER FOR GYNECOLOGICAL EXAMINATION WITHOUT ABNORMAL FINDING: Primary | ICD-10-CM

## 2020-07-03 DIAGNOSIS — N39.3 STRESS INCONTINENCE IN FEMALE: Primary | ICD-10-CM

## 2020-07-03 DIAGNOSIS — E78.5 HYPERLIPIDEMIA, UNSPECIFIED HYPERLIPIDEMIA TYPE: ICD-10-CM

## 2020-07-03 PROCEDURE — 99396 PREV VISIT EST AGE 40-64: CPT | Performed by: OBSTETRICS & GYNECOLOGY

## 2020-07-03 PROCEDURE — 99213 OFFICE O/P EST LOW 20 MIN: CPT | Mod: 25 | Performed by: OBSTETRICS & GYNECOLOGY

## 2020-07-03 RX ORDER — OXYCODONE HYDROCHLORIDE 5 MG/1
5 TABLET ORAL ONCE
Qty: 1 TABLET | Refills: 0 | Status: SHIPPED | OUTPATIENT
Start: 2020-07-03 | End: 2020-07-03

## 2020-07-03 RX ORDER — CEPHALEXIN 500 MG/1
CAPSULE ORAL
Qty: 2 CAPSULE | Refills: 0 | Status: ON HOLD | OUTPATIENT
Start: 2020-07-03 | End: 2020-08-25

## 2020-07-03 RX ORDER — SECUKINUMAB 150 MG/ML
INJECTION SUBCUTANEOUS
COMMUNITY
Start: 2020-06-10 | End: 2022-11-09

## 2020-07-03 RX ORDER — ALPRAZOLAM 0.5 MG
0.5 TABLET ORAL ONCE
Qty: 1 TABLET | Refills: 0 | Status: SHIPPED | OUTPATIENT
Start: 2020-07-03 | End: 2020-07-03

## 2020-07-03 ASSESSMENT — MIFFLIN-ST. JEOR: SCORE: 1424.28

## 2020-07-07 ENCOUNTER — TELEPHONE (OUTPATIENT)
Dept: OBGYN | Facility: CLINIC | Age: 45
End: 2020-07-07

## 2020-07-07 NOTE — TELEPHONE ENCOUNTER
Additional Instructions for the Case    H&P TO BE COMPLETED BY:   Surgeon  FOR H&P TO BE DONE BY SURGEON   ALREADY DONE:  DATE  7/3/20  SAME DAY/OBSERVATION/INPATIENT: NA    EQUIPMENT: solyx sling  VENDOR NEEDED AT CASE: solyx sling rep  LABS/SPECIAL INSTRUCTIONS: NA  TIME OFF WORK: 1 day  rx for oxy/xanax/keflex sent in

## 2020-07-13 DIAGNOSIS — Z11.59 ENCOUNTER FOR SCREENING FOR OTHER VIRAL DISEASES: Primary | ICD-10-CM

## 2020-07-13 NOTE — TELEPHONE ENCOUNTER
Type of surgery: SLING FOR STRESS INCONTINENCE   Location of surgery: [facility]  Date and time of surgery: 8/11/2020 8a  Surgeon: Donna  Pre-Op Appt Date: 7/3/2020  Post-Op Appt Date: TBD   Packet sent out: MAILED 7/13/2020  Pre-cert/Authorization completed:  TBD  Date: 7/13/2020 Annie gandara/Tresa Garcia  Surgery Scheduler    DX N39.3  CPT 12738

## 2020-07-14 ENCOUNTER — MYC MEDICAL ADVICE (OUTPATIENT)
Dept: OBGYN | Facility: CLINIC | Age: 45
End: 2020-07-14

## 2020-07-15 NOTE — TELEPHONE ENCOUNTER
"Agree with what you told her  Different people do different sedation per patient.  As we discussed we can do from deep MAC and local so she's almost asleep but not intubated and then has no awareness but also can't cough,  Local with oxy and xanax to \"take the edge off\" but needs   Or just local if really thinks she can handle that  i'm fine with whatever she's most comfortable with  Some people have a sling done at the time of other surgery like a hyst or A/P repair and that may be what she saw as general anesthesia. The TVT and TOT was also more painful d/t to how long it was and where it came out so did anesthesia with those    We do not do the one with abdominal puncture in most offices anymore. There was an old mesh called TVT that was long and came out in abdomen, then TOT that came out in the groin and now we do the \"mini\" mesh that is just right under the urethra and doesn't \"come out\" anywhere.    Only with a cystocele do we do a catheter. So no holcomb, unless like you said, there's a complication  "

## 2020-07-16 NOTE — TELEPHONE ENCOUNTER
Per pt request. This case has moved.    Spoke w/Diane at [facility] for changes as below    8/4/2020 11:20a    Sailaja Garcia  Surgery Scheduler

## 2020-07-30 ENCOUNTER — TELEPHONE (OUTPATIENT)
Dept: OBGYN | Facility: CLINIC | Age: 45
End: 2020-07-30

## 2020-07-30 DIAGNOSIS — Z11.59 ENCOUNTER FOR SCREENING FOR OTHER VIRAL DISEASES: ICD-10-CM

## 2020-07-30 PROCEDURE — U0003 INFECTIOUS AGENT DETECTION BY NUCLEIC ACID (DNA OR RNA); SEVERE ACUTE RESPIRATORY SYNDROME CORONAVIRUS 2 (SARS-COV-2) (CORONAVIRUS DISEASE [COVID-19]), AMPLIFIED PROBE TECHNIQUE, MAKING USE OF HIGH THROUGHPUT TECHNOLOGIES AS DESCRIBED BY CMS-2020-01-R: HCPCS | Performed by: OBSTETRICS & GYNECOLOGY

## 2020-07-30 NOTE — TELEPHONE ENCOUNTER
[facility] Same day surgery calling in preparation for pt's procedure: 8/4/20 Sling Operation with Dr Thompson    Her pre-op is dated 7/3/20 on day over 30 days    Routing to Dr Thompson to advise.  Call same day surgery when updated.    Collette York RN on 7/30/2020 at 12:49 PM

## 2020-07-31 LAB
SARS-COV-2 RNA SPEC QL NAA+PROBE: NOT DETECTED
SPECIMEN SOURCE: NORMAL

## 2020-07-31 NOTE — TELEPHONE ENCOUNTER
Called same day surgery to speak with someone on this at 0900 Information given - if local no pre-op needed. They will call back to speak with nurse  In triage.    Collette York RN

## 2020-08-03 ENCOUNTER — MYC MEDICAL ADVICE (OUTPATIENT)
Dept: OBGYN | Facility: CLINIC | Age: 45
End: 2020-08-03

## 2020-08-03 DIAGNOSIS — Z11.59 ENCOUNTER FOR SCREENING FOR OTHER VIRAL DISEASES: Primary | ICD-10-CM

## 2020-08-03 NOTE — TELEPHONE ENCOUNTER
Gricelda from  states this case for tomorrow needs to reschedule due to covid testing being outdated. Dr. Thompson tried appealing this and unable.    Case moved to 8/25/20 11:45-1:05    Patient aware. Scheduled changed.

## 2020-08-03 NOTE — TELEPHONE ENCOUNTER
OR notified - they will be asking Dr Thompson to sign off tomorrow. No need to do anything further.    Collette York RN on 8/3/2020 at 10:02 AM

## 2020-08-04 ENCOUNTER — MYC MEDICAL ADVICE (OUTPATIENT)
Dept: OBGYN | Facility: CLINIC | Age: 45
End: 2020-08-04

## 2020-08-05 NOTE — TELEPHONE ENCOUNTER
Patient sent [employer] message wondering if she could have this done on 8/11. As if right now there is nothing available at hospital. Patient aware via [employer] message. See encounter.

## 2020-08-21 DIAGNOSIS — Z11.59 ENCOUNTER FOR SCREENING FOR OTHER VIRAL DISEASES: ICD-10-CM

## 2020-08-21 PROCEDURE — U0003 INFECTIOUS AGENT DETECTION BY NUCLEIC ACID (DNA OR RNA); SEVERE ACUTE RESPIRATORY SYNDROME CORONAVIRUS 2 (SARS-COV-2) (CORONAVIRUS DISEASE [COVID-19]), AMPLIFIED PROBE TECHNIQUE, MAKING USE OF HIGH THROUGHPUT TECHNOLOGIES AS DESCRIBED BY CMS-2020-01-R: HCPCS | Performed by: FAMILY MEDICINE

## 2020-08-22 LAB
SARS-COV-2 RNA SPEC QL NAA+PROBE: NORMAL
SPECIMEN SOURCE: NORMAL

## 2020-08-23 LAB
LABORATORY COMMENT REPORT: NORMAL
SARS-COV-2 RNA SPEC QL NAA+PROBE: NEGATIVE
SPECIMEN SOURCE: NORMAL

## 2020-08-24 ENCOUNTER — MYC MEDICAL ADVICE (OUTPATIENT)
Dept: OBGYN | Facility: CLINIC | Age: 45
End: 2020-08-24

## 2020-08-24 NOTE — TELEPHONE ENCOUNTER
Due to equipment issues this case has been moved as follows  Spoke w/Diane at [facility] for changes  Spoke to pt to confirm.    10:40a SX time 8:40a arrival time    Sailaja Garcia  Surgery Scheduler

## 2020-08-25 ENCOUNTER — HOSPITAL ENCOUNTER (OUTPATIENT)
Facility: CLINIC | Age: 45
Discharge: HOME | End: 2020-08-25
Attending: OBSTETRICS & GYNECOLOGY | Admitting: OBSTETRICS & GYNECOLOGY
Payer: COMMERCIAL

## 2020-08-25 VITALS
WEIGHT: 175.5 LBS | TEMPERATURE: 96.9 F | OXYGEN SATURATION: 96 % | RESPIRATION RATE: 12 BRPM | DIASTOLIC BLOOD PRESSURE: 74 MMHG | BODY MASS INDEX: 31.1 KG/M2 | SYSTOLIC BLOOD PRESSURE: 95 MMHG | HEIGHT: 63 IN | HEART RATE: 76 BPM

## 2020-08-25 DIAGNOSIS — N39.3 STRESS INCONTINENCE IN FEMALE: Primary | ICD-10-CM

## 2020-08-25 DIAGNOSIS — G89.18 POSTOPERATIVE PAIN: ICD-10-CM

## 2020-08-25 LAB — B-HCG SERPL-ACNC: <1 IU/L (ref 0–5)

## 2020-08-25 PROCEDURE — 36000056 ZZH SURGERY LEVEL 3 1ST 30 MIN: Performed by: OBSTETRICS & GYNECOLOGY

## 2020-08-25 PROCEDURE — 71000012 ZZH RECOVERY PHASE 1 LEVEL 1 FIRST HR: Performed by: OBSTETRICS & GYNECOLOGY

## 2020-08-25 PROCEDURE — 36000058 ZZH SURGERY LEVEL 3 EA 15 ADDTL MIN: Performed by: OBSTETRICS & GYNECOLOGY

## 2020-08-25 PROCEDURE — 36415 COLL VENOUS BLD VENIPUNCTURE: CPT | Performed by: OBSTETRICS & GYNECOLOGY

## 2020-08-25 PROCEDURE — 25000132 ZZH RX MED GY IP 250 OP 250 PS 637: Performed by: OBSTETRICS & GYNECOLOGY

## 2020-08-25 PROCEDURE — 71000027 ZZH RECOVERY PHASE 2 EACH 15 MINS: Performed by: OBSTETRICS & GYNECOLOGY

## 2020-08-25 PROCEDURE — 84702 CHORIONIC GONADOTROPIN TEST: CPT | Performed by: OBSTETRICS & GYNECOLOGY

## 2020-08-25 PROCEDURE — 57288 REPAIR BLADDER DEFECT: CPT | Performed by: OBSTETRICS & GYNECOLOGY

## 2020-08-25 PROCEDURE — 27210794 ZZH OR GENERAL SUPPLY STERILE: Performed by: OBSTETRICS & GYNECOLOGY

## 2020-08-25 PROCEDURE — C1771 REP DEV, URINARY, W/SLING: HCPCS | Performed by: OBSTETRICS & GYNECOLOGY

## 2020-08-25 PROCEDURE — 25000125 ZZHC RX 250: Performed by: OBSTETRICS & GYNECOLOGY

## 2020-08-25 PROCEDURE — 40000170 ZZH STATISTIC PRE-PROCEDURE ASSESSMENT II: Performed by: OBSTETRICS & GYNECOLOGY

## 2020-08-25 DEVICE — SIS SYSTEM
Type: IMPLANTABLE DEVICE | Site: VAGINA | Status: FUNCTIONAL
Brand: SOLYX™ SIS SYSTEM

## 2020-08-25 RX ORDER — OXYCODONE HYDROCHLORIDE 5 MG/1
5-10 TABLET ORAL EVERY 4 HOURS PRN
Qty: 4 TABLET | Refills: 0 | Status: SHIPPED | OUTPATIENT
Start: 2020-08-25 | End: 2021-08-20

## 2020-08-25 RX ORDER — SODIUM CHLORIDE, SODIUM LACTATE, POTASSIUM CHLORIDE, CALCIUM CHLORIDE 600; 310; 30; 20 MG/100ML; MG/100ML; MG/100ML; MG/100ML
INJECTION, SOLUTION INTRAVENOUS CONTINUOUS
Status: DISCONTINUED | OUTPATIENT
Start: 2020-08-25 | End: 2020-08-25 | Stop reason: HOSPADM

## 2020-08-25 RX ORDER — OXYCODONE HYDROCHLORIDE 5 MG/1
5 TABLET ORAL
Status: DISCONTINUED | OUTPATIENT
Start: 2020-08-25 | End: 2020-08-25 | Stop reason: HOSPADM

## 2020-08-25 RX ORDER — LIDOCAINE HYDROCHLORIDE 20 MG/ML
JELLY TOPICAL
Status: DISCONTINUED
Start: 2020-08-25 | End: 2020-08-25 | Stop reason: HOSPADM

## 2020-08-25 RX ORDER — LIDOCAINE HYDROCHLORIDE 10 MG/ML
INJECTION, SOLUTION INFILTRATION; PERINEURAL PRN
Status: DISCONTINUED | OUTPATIENT
Start: 2020-08-25 | End: 2020-08-25 | Stop reason: HOSPADM

## 2020-08-25 RX ORDER — IBUPROFEN 600 MG/1
600 TABLET, FILM COATED ORAL
Status: DISCONTINUED | OUTPATIENT
Start: 2020-08-25 | End: 2020-08-25 | Stop reason: HOSPADM

## 2020-08-25 RX ORDER — LIDOCAINE HYDROCHLORIDE 10 MG/ML
INJECTION, SOLUTION EPIDURAL; INFILTRATION; INTRACAUDAL; PERINEURAL
Status: DISCONTINUED
Start: 2020-08-25 | End: 2020-08-25 | Stop reason: HOSPADM

## 2020-08-25 RX ORDER — PHENAZOPYRIDINE HYDROCHLORIDE 200 MG/1
200 TABLET, FILM COATED ORAL ONCE
Status: COMPLETED | OUTPATIENT
Start: 2020-08-25 | End: 2020-08-25

## 2020-08-25 RX ADMIN — PHENAZOPYRIDINE HYDROCHLORIDE 200 MG: 200 TABLET ORAL at 09:35

## 2020-08-25 ASSESSMENT — MIFFLIN-ST. JEOR: SCORE: 1420.19

## 2020-08-25 NOTE — DISCHARGE INSTRUCTIONS
Same Day Surgery Discharge Instructions for  Sedation and General Anesthesia       It's not unusual to feel dizzy, light-headed or faint for up to 24 hours after surgery or while taking pain medication.  If you have these symptoms: sit for a few minutes before standing and have someone assist you when you get up to walk or use the bathroom.      You should rest and relax for the next 24 hours. We recommend you make arrangements to have an adult stay with you for at least 24 hours after your discharge.  Avoid hazardous and strenuous activity.      DO NOT DRIVE any vehicle or operate mechanical equipment for 24 hours following the end of your surgery.  Even though you may feel normal, your reactions may be affected by the medication you have received.      Do not drink alcoholic beverages for 24 hours following surgery.       Slowly progress to your regular diet as you feel able. It's not unusual to feel nauseated and/or vomit after receiving anesthesia.  If you develop these symptoms, drink clear liquids (apple juice, ginger ale, broth, 7-up, etc. ) until you feel better.  If your nausea and vomiting persists for 24 hours, please notify your surgeon.        All narcotic pain medications, along with inactivity and anesthesia, can cause constipation. Drinking plenty of liquids and increasing fiber intake will help.      For any questions of a medical nature, call your surgeon.      Do not make important decisions for 24 hours.      If you had general anesthesia, you may have a sore throat for a couple of days related to the breathing tube used during surgery.  You may use Cepacol lozenges to help with this discomfort.  If it worsens or if you develop a fever, contact your surgeon.       If you feel your pain is not well managed with the pain medications prescribed by your surgeon, please contact your surgeon's office to let them know so they can address your concerns.       CoVid 19 Information    We want to give you  information regarding Covid. Please consult your primary care provider with any questions you might have.     Patient who have symptoms (cough, fever, or shortness of breath), need to isolate for 7 days from when symptoms started OR 72 hours after fever resolves (without fever reducing medications) AND improvement of respiratory symptoms (whichever is longer).      Isolate yourself at home (in own room/own bathroom if possible)    Do Not allow any visitors    Do Not go to work or school    Do Not go to [employer],  centers, shopping, or other public places.    Do Not shake hands.    Avoid close and intimate contact with others (hugging, kissing).    Follow CDC recommendations for household cleaning of frequently touched services.     After the initial 7 days, continue to isolate yourself from household members as much as possible. To continue decrease the risk of community spread and exposure, you and any members of your household should limit activities in public for 14 days after starting home isolation.     You can reference the following CDC link for helpful home isolation/care tips:  https://www.cdc.gov/coronavirus/2019-ncov/downloads/10Things.pdf    Protect Others:    Cover Your Mouth and Nose with a mask, disposable tissue or wash cloth to avoid spreading germs to others.    Wash your hands and face frequently with soap and water    Call Your Primary Doctor If: Breathing difficulty develops or you become worse.    For more information about COVID19 and options for caring for yourself at home, please visit the CDC website at https://www.cdc.gov/coronavirus/2019-ncov/about/steps-when-sick.html  For more options for care at [facility], please visit our website at [email or web address]       **If you have questions or concerns about your procedure,  call Dr. Thompson at [phone]**

## 2020-08-25 NOTE — BRIEF OP NOTE
[facility]    Brief Operative Note    Pre-operative diagnosis: Stress incontinence in female [N39.3]  Post-operative diagnosis same    Procedure: Procedure(s):  SLING OPERATION, FOR STRESS INCONTINENCE, WITHOUT ANTERIOR COLPORRHAPHY, CYSTOSCOPY  Surgeon: Surgeon(s) and Role:     * Monica Thompson MD - Primary  Anesthesia: Local   Estimated blood loss:150  ml  Drains: None  Specimens: none  Findings:   Normal vaginal vault and urethra. Sling placed and then cysto done. Normal bladder w/o injury or abnormality. Bilateral patent UO's. Bladder filled to 300cc and cysto removed and patient allowed to cough and no MECHE elicited.  Complications: None.  Implants:   Implant Name Type Inv. Item Serial No.  Lot No. LRB No. Used Action   SLING SOLYX TRANSVAGINAL MESH [id number] Mesh SLING SOLYX TRANSVAGINAL MESH [id number]  [employer] [id number] N/A 1 Implanted

## 2020-08-25 NOTE — OR NURSING
On arrival to phase 2 after returning from bathroom patient became pale, diaphoretic, and felt like she was going to pass out. Placed flat in recliner, applied cold washcloth to forehead and assessed vitals. Patient remained talking entire time and did not lose consciousness. Felt better within a few minutes. Patient states she took Oxycodone and Xanax as one time ordered dose from Dr. Thompson.

## 2020-08-26 NOTE — OP NOTE
Procedure Date: 2020      PREOPERATIVE DIAGNOSIS:  Stress urinary incontinence.      POSTOPERATIVE DIAGNOSIS:  Stress urinary incontinence.      PROCEDURE:  Suburethral Solyx sling and cystoscopy.      SURGEON:  Monica Thompson MD      ANESTHESIA:  25 mL of 1% lidocaine along with 5 mg of oral oxycodone and 0.5 mg of oral Xanax.      ESTIMATED BLOOD LOSS:  150 mL.      COMPLICATIONS:  None.      SPECIMENS:  None.      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  We have discussed this on several occasions over the last few years during her annual exam, and it continued to be a significant problem that she did finally decide to address.  We discussed the pros and cons as well as the risks and benefits of the procedure, and she wished to proceed.      OPERATIVE FINDINGS:  The patient had a normal vaginal vault and a normal urethral meatus.  The sling was placed in the normal fashion, and then cystoscopy was done.  The bladder was instilled with 300 mL of normal saline after emptying the bladder contents, and the bladder itself was completely normal without injury or abnormality.  There were bilaterally patent ureteral orifices.  The bladder was then filled to 300 mL normal saline, and the cystoscope was removed.  The patient coughed, and no stress urinary incontinence was elicited.      PROCEDURE IN DETAIL:  The patient was taken to the operating room, where she was placed in dorsal lithotomy position.  The patient had already preoperatively taken oral Keflex, Xanax and oxycodone.  She also had a negative COVID test.  Once the patient was in dorsal lithotomy position, she was prepped and draped in the  normal fashion.  A timeout was undertaken.  A vaginal retractor was placed into the posterior vault, and an area about 1 cm below the urethral meatus was injected with 1% lidocaine for a total of 5 mL, blanching the vaginal mucosa centrally and laterally.  Once this was done, a scalpel was used to create a 1.5 cm vertical incision in the mucosa.  Carpenter scissors were then used to undermine the subcutaneous tissues to allow for a flat place for the mesh to lie.  Next, 10 mL was injected through the vaginal mucosa incision, following the channel laterally to the right to the obturator internus muscle.  This was then again done on the left side with an additional 10 mL of 1% lidocaine.  Once this was anesthetized, Carpenter scissors were used to create the channel that led to the obturator internus muscle.  The Solyx sling was then marked in the midline and attached to the introducer.  The sling was placed through the vaginal mucosa incision to the patient's right side and easily tethered into the obturator internus muscle on that side.  The introducer was then removed and retracted back.  It was attached to the left side of the Solyx sling, and following the previously anesthetized channel, this was also followed with the sling and tethered into the obturator internus.  The introducer was not yet removed, however.  At this point, some Lido-Jet was used in the urethra, and the cystoscope was placed.  Using the cystoscope, the bladder was drained for previously made urine and then instilled with 300 mL of normal saline.  We were able to inspect the whole bladder, and it was completely normal without any defect, and bilateral ureteral orifices were seen.  At this point, the cystoscope was removed.  The patient was then asked to cough on 3 different occasions, and with good strong cough, there was absolutely no leakage elicited.  At this point, the Solyx sling was deployed and the introducer removed.  The vaginal mucosa  was then closed with 3-0 Vicryl in a running unlocked fashion.  There was excellent hemostasis noted, and the patient tolerated the procedure very well.  Sponge and instrument counts were correct x 2.      DRAINS:  None.      DISPOSITION:  The patient was taken to postanesthesia recovery room in stable condition with a plan to be discharged to home as long as able to void approximately 300 mL or more of urine.         ADI REY MD             D: 2020   T: 2020   MT: SIDDHARTH      Name:     ARABELLA PHILLIPS   MRN:      [id number]        Account:        [id number]   :      1975           Procedure Date: 2020      Document: [id number]

## 2020-09-04 ENCOUNTER — MYC MEDICAL ADVICE (OUTPATIENT)
Dept: OBGYN | Facility: CLINIC | Age: 45
End: 2020-09-04

## 2020-09-04 NOTE — TELEPHONE ENCOUNTER
The sutures may be further in vaginally where she can't feel them but as long as no acute red bleeding everything is intact/holding    The positioning and difficulty starting a stream are normal. It's from swelling and will improve with time

## 2020-09-16 ENCOUNTER — ANCILLARY PROCEDURE (OUTPATIENT)
Dept: MAMMOGRAPHY | Facility: CLINIC | Age: 45
End: 2020-09-16
Attending: OBSTETRICS & GYNECOLOGY
Payer: COMMERCIAL

## 2020-09-16 DIAGNOSIS — Z12.31 VISIT FOR SCREENING MAMMOGRAM: ICD-10-CM

## 2020-09-16 PROCEDURE — 77067 SCR MAMMO BI INCL CAD: CPT | Mod: TC

## 2020-09-16 PROCEDURE — 77063 BREAST TOMOSYNTHESIS BI: CPT | Mod: TC

## 2020-09-23 ENCOUNTER — TELEPHONE (OUTPATIENT)
Dept: OBGYN | Facility: CLINIC | Age: 45
End: 2020-09-23

## 2020-09-23 NOTE — TELEPHONE ENCOUNTER
Patient is calling today just to check in with 's. She does not think she needs to do a Post op appointment. Please call patient today.

## 2020-09-23 NOTE — TELEPHONE ENCOUNTER
Alvarado is doing great, she had no concerns since her procedure will not make a post op appt unless advised by Dr. Thompson.  Saskia Mejia RN on 9/23/2020 at 3:21 PM

## 2020-11-29 ENCOUNTER — HEALTH MAINTENANCE LETTER (OUTPATIENT)
Age: 45
End: 2020-11-29

## 2020-12-31 ENCOUNTER — OFFICE VISIT (OUTPATIENT)
Dept: URGENT CARE | Facility: URGENT CARE | Age: 45
End: 2020-12-31
Payer: OTHER MISCELLANEOUS

## 2020-12-31 VITALS
DIASTOLIC BLOOD PRESSURE: 64 MMHG | HEART RATE: 67 BPM | TEMPERATURE: 98 F | RESPIRATION RATE: 14 BRPM | HEIGHT: 63 IN | SYSTOLIC BLOOD PRESSURE: 110 MMHG | BODY MASS INDEX: 31.01 KG/M2 | WEIGHT: 175 LBS
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
## 2020-12-31 PROCEDURE — 99203 OFFICE O/P NEW LOW 30 MIN: CPT | Performed by: NURSE PRACTITIONER

## 2020-12-31 ASSESSMENT — MIFFLIN-ST. JEOR: SCORE: 1417.92

## 2020-12-31 NOTE — LETTER
[facility] URGENT CARE [facility]  [address] [address]        2020    Eliza MALIK Wheelwright  [address] [facility] [address] [phone] (home)     :     1975      To Whom it May Concern:    This patient was seen in clinic 2020 due to cat scratch injury. She may return to work as able on .    Please contact me for questions or concerns.    Sincerely,    Sena Castillo NP

## 2020-12-31 NOTE — PATIENT INSTRUCTIONS
Augmentin (amoxicillin-clavulanate) 875mg twice daily for 14 days as directed.  Ibuprofen as needed for pain/swelling- may take 2-3 tablets 3 times daily with food.  Tetanus vaccine was given 2012.  Discussed close evaluation of symptoms.   Monitor for signs of infection including fever, thick yellow/white discharge, a hard or soft lump under the skin or increasing pain, redness, warmth and/or swelling, fever, lymph nodes. If you have any of these or persistent symptoms that are not healing, follow up with your primary care provider.   Rabies prophylactic vaccination recommended if the animal is unavailable for quarantine/testing OR it develops signs of rabies in the next 10 days.  Follow up with primary care provider with any problems, questions or concerns or if symptoms worsen or fail to improve. Patient agreed to plan and verbalized understanding.

## 2020-12-31 NOTE — PROGRESS NOTES
Chief Complaint   Patient presents with     Urgent Care     Work Comp     clawed by cat left hand and right finger today.      SUBJECTIVE:  Eliza Nascimento is a 45 year old female who presents to the clinic today after being clawed by a cat at work today. Left hand dorsum with purple red bruise 1 inch in diameter with puncture wounds. Right pointer finger also has a 1cm cut. Scant serosanguinous drainage. Denies numbness, tingling, radiating pain. Wounds were already cleansed with antimicrobial surgical scrub prior to clinic visit. Tetanus vaccine was given 06/2012.    Past Medical History:   Diagnosis Date     Allergic rhinitis due to pollen      Hyperlipidemia     [facility]. Ankur Hermosillo     Hyperlipidemia, unspecified hyperlipidemia type 6/16/2016     Infertility counseling     pt. with PCOS and  with only one testicle and semen analysis shows decreased normal motility and shape but normal counts-saw urology-no tx done/available. clomid x4 with metformin and IUIx2 w/o pregnancy then conceived on own when took month off     PCOS (polycystic ovarian syndrome)      Spondylosis     +HLA-27. sees Dr. Eller of Rheum and takes rx naproxen BID     Stress incontinence           COSENTYX SENSOREADY  MG/ML Sensoready pen,        naproxen sodium (ANAPROX) 550 MG tablet, Take by mouth 2 times daily (with meals)       simvastatin (ZOCOR) 40 MG tablet, Take 1 tablet (40 mg) by mouth At Bedtime       oxyCODONE (ROXICODONE) 5 MG tablet, Take 1-2 tablets (5-10 mg) by mouth every 4 hours as needed for moderate to severe pain    No current facility-administered medications on file prior to visit.     Social History     Tobacco Use     Smoking status: Never Smoker     Smokeless tobacco: Never Used   Substance Use Topics     Alcohol use: Yes     Alcohol/week: 0.0 standard drinks     No Known Allergies    Review of Systems   Constitutional: Negative for activity change, appetite change, chills,  "diaphoresis, fatigue and fever.   Cardiovascular: Negative for chest pain, palpitations and peripheral edema.   Gastrointestinal: Negative for abdominal pain, nausea and vomiting.   Skin: Positive for color change and wound. Negative for rash.   Neurological: Negative for weakness, numbness and paresthesias.   Hematological: Does not bruise/bleed easily.     EXAM:   /64   Pulse 67   Temp 98  F (36.7  C) (Oral)   Resp 14   Ht 1.6 m (5' 3\")   Wt 79.4 kg (175 lb)   LMP 12/24/2020   Breastfeeding No   BMI 31.00 kg/m      Physical Exam  Vitals signs reviewed.   Constitutional:       General: She is not in acute distress.     Appearance: Normal appearance. She is not ill-appearing.   HENT:      Head: Normocephalic and atraumatic.   Cardiovascular:      Rate and Rhythm: Normal rate.   Pulmonary:      Effort: Pulmonary effort is normal.   Musculoskeletal: Normal range of motion.         General: Swelling, tenderness and signs of injury present.   Skin:     General: Skin is warm and dry.      Findings: Bruising and erythema present. No rash.      Comments: Left hand dorsum with purple red bruise 1 inch in diameter with puncture wounds. Right pointer finger also has a 1cm cut. Scant serosanguinous drainage.   Neurological:      General: No focal deficit present.      Mental Status: She is alert and oriented to person, place, and time.   Psychiatric:         Mood and Affect: Mood normal.         Behavior: Behavior normal.       ASSESSMENT:    ICD-10-CM    1. Cat scratch  W55.03XA amoxicillin-clavulanate (AUGMENTIN) 875-125 MG tablet     PLAN:  Patient Instructions   Augmentin (amoxicillin-clavulanate) 875mg twice daily for 14 days as directed.  Ibuprofen as needed for pain/swelling- may take 2-3 tablets 3 times daily with food.  Tetanus vaccine was given 2012.  Discussed close evaluation of symptoms.   Monitor for signs of infection including fever, thick yellow/white discharge, a hard or soft lump under the skin " or increasing pain, redness, warmth and/or swelling, fever, lymph nodes. If you have any of these or persistent symptoms that are not healing, follow up with your primary care provider.   Rabies prophylactic vaccination recommended if the animal is unavailable for quarantine/testing OR it develops signs of rabies in the next 10 days.  Follow up with primary care provider with any problems, questions or concerns or if symptoms worsen or fail to improve. Patient agreed to plan and verbalized understanding.    Follow up with primary care provider with any problems, questions or concerns or if symptoms worsen or fail to improve. Patient agreed to plan and verbalized understanding.    Sena Castillo, CRISTOFER-BC  [facility] URGENT [facility]

## 2021-01-01 ASSESSMENT — ENCOUNTER SYMPTOMS
FATIGUE: 0
WOUND: 1
BRUISES/BLEEDS EASILY: 0
DIAPHORESIS: 0
COLOR CHANGE: 1
ABDOMINAL PAIN: 0
NUMBNESS: 0
CHILLS: 0
FEVER: 0
WEAKNESS: 0
ACTIVITY CHANGE: 0
PALPITATIONS: 0
APPETITE CHANGE: 0
NAUSEA: 0
VOMITING: 0
PARESTHESIAS: 0

## 2021-02-28 ENCOUNTER — IMMUNIZATION (OUTPATIENT)
Dept: NURSING | Facility: CLINIC | Age: 46
End: 2021-02-28
Payer: COMMERCIAL

## 2021-02-28 PROCEDURE — 91301 PR COVID VAC MODERNA 100 MCG/0.5 ML IM: CPT

## 2021-02-28 PROCEDURE — 0011A PR COVID VAC MODERNA 100 MCG/0.5 ML IM: CPT

## 2021-04-06 ENCOUNTER — IMMUNIZATION (OUTPATIENT)
Dept: NURSING | Facility: CLINIC | Age: 46
End: 2021-04-06
Attending: INTERNAL MEDICINE
Payer: COMMERCIAL

## 2021-04-06 PROCEDURE — 91301 PR COVID VAC MODERNA 100 MCG/0.5 ML IM: CPT

## 2021-04-06 PROCEDURE — 0012A PR COVID VAC MODERNA 100 MCG/0.5 ML IM: CPT

## 2021-08-20 ENCOUNTER — OFFICE VISIT (OUTPATIENT)
Dept: OBGYN | Facility: CLINIC | Age: 46
End: 2021-08-20
Payer: COMMERCIAL

## 2021-08-20 VITALS
HEIGHT: 63 IN | WEIGHT: 184 LBS | BODY MASS INDEX: 32.6 KG/M2 | DIASTOLIC BLOOD PRESSURE: 74 MMHG | SYSTOLIC BLOOD PRESSURE: 126 MMHG

## 2021-08-20 DIAGNOSIS — E78.5 HYPERLIPIDEMIA, UNSPECIFIED HYPERLIPIDEMIA TYPE: ICD-10-CM

## 2021-08-20 DIAGNOSIS — M12.80 HLA-B27 POSITIVE ARTHROPATHY: ICD-10-CM

## 2021-08-20 DIAGNOSIS — Z01.419 ENCOUNTER FOR GYNECOLOGICAL EXAMINATION WITHOUT ABNORMAL FINDING: Primary | ICD-10-CM

## 2021-08-20 DIAGNOSIS — N92.0 MENORRHAGIA WITH REGULAR CYCLE: ICD-10-CM

## 2021-08-20 PROCEDURE — 99396 PREV VISIT EST AGE 40-64: CPT | Performed by: OBSTETRICS & GYNECOLOGY

## 2021-08-20 PROCEDURE — 99212 OFFICE O/P EST SF 10 MIN: CPT | Mod: 25 | Performed by: OBSTETRICS & GYNECOLOGY

## 2021-08-20 ASSESSMENT — ANXIETY QUESTIONNAIRES
1. FEELING NERVOUS, ANXIOUS, OR ON EDGE: NOT AT ALL
5. BEING SO RESTLESS THAT IT IS HARD TO SIT STILL: NOT AT ALL
IF YOU CHECKED OFF ANY PROBLEMS ON THIS QUESTIONNAIRE, HOW DIFFICULT HAVE THESE PROBLEMS MADE IT FOR YOU TO DO YOUR WORK, TAKE CARE OF THINGS AT HOME, OR GET ALONG WITH OTHER PEOPLE: NOT DIFFICULT AT ALL
GAD7 TOTAL SCORE: 0
6. BECOMING EASILY ANNOYED OR IRRITABLE: NOT AT ALL
7. FEELING AFRAID AS IF SOMETHING AWFUL MIGHT HAPPEN: NOT AT ALL
2. NOT BEING ABLE TO STOP OR CONTROL WORRYING: NOT AT ALL
3. WORRYING TOO MUCH ABOUT DIFFERENT THINGS: NOT AT ALL

## 2021-08-20 ASSESSMENT — MIFFLIN-ST. JEOR: SCORE: 1453.75

## 2021-08-20 ASSESSMENT — PATIENT HEALTH QUESTIONNAIRE - PHQ9
SUM OF ALL RESPONSES TO PHQ QUESTIONS 1-9: 0
5. POOR APPETITE OR OVEREATING: NOT AT ALL

## 2021-08-20 NOTE — PROGRESS NOTES
"  Eliza is a 46 year old  female who presents for annual exam.     Besides routine health maintenance,  she would like to discuss options for period control.    HPI:  The patient's PCP is [facility].      Patient is doing overall really well. Had a sling for her MECHE about one year ago. Had no issues at all and all sx resolved. Never did a postop check b/c had no concerns.  Still finds herself \"bracing\" or crossing her legs and took months to stop doing it every time she sneezed or laughed but then has no leakage now. Has no increase in OAB or urgency. Had some initial difficulty completely emptying and would have to sit there for a few extra seconds or lean forward or would stand up and find she had to go again but that is better and also just expects it and does what she needs to for full emptying. Really happy with it.    Was on ocps for a long time. For contraception and for period control. Then her  got a vas a couple of years ago and she stopped her ocps and now her periods are every 24-26 days, last 5 days, 2-3 are really heavy. Some cramping but tolerable with pain meds. However so close together and heavy that starting to impact her work and social plans and activities and more and more frustrated by it as more time off ocps goes by. Some PMS as well, both physical and irritablity/emotional but that is not as big of an issue    Patient sees her PCP for her labs and lipid mgmt. On statin 40mg and last labs per her report were all normal.    Sees rheum for her ankylosing spondylitis. Fought meds for years but then eventually went on them. Did humira for several months and felt great. No pain at all but then had bump in her liver enzymes and now can't be on it. Now on cosentyx. Tolerates if fine and pain def better on it than not but not as good as the original biologic she was on.    Has had her covid vax with last one in early April and wondering about boosters and wondering " about giving it to Gerard who is old enough. Freddie will be 13 this November so she's not old enough yet. Patient's  wants gerard to get it and she's less sure just in the peds age group not about vaxx in general          GYNECOLOGIC HISTORY:    Patient's last menstrual period was 2021 (exact date).    Regular menses? yes  Menses every 24-26 days.  Length of menses: 5 days    Her current contraception method is: vasectomy.  She  reports that she has never smoked. She has never used smokeless tobacco.    Patient is sexually active.  STD testing offered?  Declined  Last PHQ-9 score on record =   PHQ-9 SCORE 2021   PHQ-9 Total Score 0     Last GAD7 score on record =   RADHA-7 SCORE 2021   Total Score 0     Alcohol Score = 4    HEALTH MAINTENANCE:  Cholesterol: (No results found for: CHOL   Last Mammo: One year ago, Result: Normal, Next Mammo: due in September   Pap:   Lab Results   Component Value Date    PAP NIL 2018 WNL HPV (-)neg  Colonoscopy:  NA, Result: Not applicable, Next Colonoscopy: NA years.  Dexa:  NA    Health maintenance updated:  yes    HISTORY:  OB History    Para Term  AB Living   2 2 2 0 0 2   SAB TAB Ectopic Multiple Live Births   0 0 0 0 2      # Outcome Date GA Lbr Naresh/2nd Weight Sex Delivery Anes PTL Lv   2 Term 11/01/10 39w0d  3.402 kg (7 lb 8 oz) F Vag-Spont EPI  PITO      Birth Comments: Followed by Donna      Name: Freddie   1 Term 08 39w0d  3.459 kg (7 lb 10 oz) M Vag-Spont EPI  PITO      Birth Comments: Followed by Donna and Delivered by [facility]. severe back issues during pregnancy-SI joint injection with minimal relief done at 36 weeks      Name: Gerard       Patient Active Problem List   Diagnosis     Osteoarthritis of spine, unspecified spinal osteoarthritis complication status, unspecified spinal region     HLA-B27 positive arthropathy     Uses oral contraception     Hyperlipidemia, unspecified hyperlipidemia type     Stress  "incontinence in female     Past Surgical History:   Procedure Laterality Date     Bunion surgery  2003     CYSTOSCOPY, SLING TRANSVAGINAL N/A 8/25/2020    Procedure: SLING OPERATION, FOR STRESS INCONTINENCE, WITHOUT ANTERIOR COLPORRHAPHY, CYSTOSCOPY;  Surgeon: Monica Thompson MD;  Location:  OR      Social History     Tobacco Use     Smoking status: Never Smoker     Smokeless tobacco: Never Used   Substance Use Topics     Alcohol use: Yes     Alcohol/week: 0.0 standard drinks      Problem (# of Occurrences) Relation (Name,Age of Onset)    Breast Cancer (2) Mother (52): mom's dx'd at early stage, Paternal Grandmother    Coronary Artery Disease (1) Paternal Grandmother    Diabetes (1) Paternal Grandmother    Hyperlipidemia (1) Father    No Known Problems (5) Sister, Brother, Maternal Grandfather, Paternal Grandfather, Other    Osteoporosis (1) Maternal Grandmother    Thyroid Disease (1) Mother            Current Outpatient Medications   Medication Sig     COSENTYX SENSOREADY  MG/ML Sensoready pen      naproxen sodium (ANAPROX) 550 MG tablet Take by mouth 2 times daily (with meals)     simvastatin (ZOCOR) 40 MG tablet Take 1 tablet (40 mg) by mouth At Bedtime     No current facility-administered medications for this visit.     No Known Allergies    Past medical, surgical, social and family histories were reviewed and updated in EPIC.    ROS:   12 point review of systems negative other than symptoms noted below or in the HPI.  No urinary frequency or dysuria, bladder or kidney problems, POSITIVE for:, painful menses, heavy periods    EXAM:  /74   Ht 1.6 m (5' 3\")   Wt 83.5 kg (184 lb)   LMP 08/03/2021 (Exact Date)   Breastfeeding No   BMI 32.59 kg/m     BMI: Body mass index is 32.59 kg/m .    PHYSICAL EXAM:  Constitutional:   Appearance: Well nourished, well developed, alert, in no acute distress  Neck:  Lymph Nodes:  No lymphadenopathy present    Thyroid:  Gland size normal, nontender, no nodules or " masses present  on palpation  Chest:  Respiratory Effort:  Breathing unlabored, CTA bilaterally  Cardiovascular:    Heart: Auscultation:  Regular rate, normal rhythm, no murmurs present  Breasts: Palpation of Breasts and Axillae:  No masses present on palpation, no breast tenderness., Axillary Lymph Nodes:  No lymphadenopathy present. and No nodularity, asymmetry or nipple discharge bilaterally.  Gastrointestinal:   Abdominal Examination:  Abdomen nontender to palpation, tone normal without rigidity or guarding, no masses present, umbilicus without lesions   Liver and Spleen:  No hepatomegaly present, liver nontender to palpation    Hernias:  No hernias present  Lymphatic: Lymph Nodes:  No other lymphadenopathy present  Skin:  General Inspection:  No rashes present, no lesions present, no areas of  discoloration  Neurologic:    Mental Status:  Oriented X3.  Normal strength and tone, sensory exam                grossly normal, mentation intact and speech normal.    Psychiatric:   Mentation appears normal and affect normal/bright.         Pelvic Exam:  External Genitalia:     Normal appearance for age, no discharge present, no tenderness present, no inflammatory lesions present, color normal  Vagina:     Normal vaginal vault without central or paravaginal defects, no discharge present, no inflammatory lesions present, no masses present  Bladder:     Nontender to palpation  Urethra:   Urethral Body:  Urethra palpation normal, urethra structural support normal   Urethral Meatus:  No erythema or lesions present  Cervix:     Appearance healthy, no lesions present, nontender to palpation, no bleeding present  Uterus:     Uterus: firm, normal sized and nontender, midplane in position.   Adnexa:     No adnexal tenderness present, no adnexal masses present  Perineum:     Perineum within normal limits, no evidence of trauma, no rashes or skin lesions present  Anus:     Anus within normal limits, no hemorrhoids  present  Inguinal Lymph Nodes:     No lymphadenopathy present  Pubic Hair:     Normal pubic hair distribution for age  Genitalia and Groin:     No rashes present, no lesions present, no areas of discoloration, no masses present      COUNSELING:   Reviewed preventive health counseling, as reflected in patient instructions  Special attention given to:        menorrhagia    BMI: Body mass index is 32.59 kg/m .  Weight management plan: Discussed healthy diet and exercise guidelines Patient was referred to their PCP to discuss a diet and exercise plan.    ASSESSMENT:  46 year old female with satisfactory annual exam.    ICD-10-CM    1. Encounter for gynecological examination without abnormal finding  Z01.419    2. Hyperlipidemia, unspecified hyperlipidemia type  E78.5    3. HLA-B27 positive arthropathy  M12.80    4. Menorrhagia with regular cycle  N92.0        PLAN:  Pap is UTD for 2 more years  mammo is due in September. Will attempt to sync up to her annual with me next year for ease  Defer her lipids and other labs to her PCP and her A.S to her rheum  Discussed that biologics are considered immune suppressing so she likely will qualify for a booster when they become approved. Unsure yet how the logistics of that will go in terms of timing since given vaxx and age and what med, etc will keep her updated as we know more  Strongly advised her to get her son vaccinated. Reviewed the physiology and data and pros/cons/risks and cardiomyopathy data that she's heard about    Her sling is working great and no MECHE and on exam well healed vaginal tissue overlying it  Discussed her menorrhagia and options  Could go back on ocps or could do N.R for lower dose, mirena IUD, ablation.  Discussed the pros/cons of each and the risks/side effects  Thinks she will do a mirena and will make an appointment in the near future for this  Reviewed what to expect as progresses through 40's and average age of menopause and perimenopausal  si/sx    On the date of the encounter, an additional 10 minutes was spent on reviewing imaging, lab work, and other provider notes, along with direct management of the patient's medical issues as above-menorrhagia      Monica Thompson MD

## 2021-08-21 ASSESSMENT — ANXIETY QUESTIONNAIRES: GAD7 TOTAL SCORE: 0

## 2021-09-25 ENCOUNTER — HEALTH MAINTENANCE LETTER (OUTPATIENT)
Age: 46
End: 2021-09-25

## 2022-01-12 ENCOUNTER — ANCILLARY PROCEDURE (OUTPATIENT)
Dept: MAMMOGRAPHY | Facility: CLINIC | Age: 47
End: 2022-01-12
Attending: OBSTETRICS & GYNECOLOGY
Payer: COMMERCIAL

## 2022-01-12 DIAGNOSIS — Z12.31 VISIT FOR SCREENING MAMMOGRAM: ICD-10-CM

## 2022-01-12 PROCEDURE — 77067 SCR MAMMO BI INCL CAD: CPT | Mod: TC | Performed by: RADIOLOGY

## 2022-01-12 PROCEDURE — 77063 BREAST TOMOSYNTHESIS BI: CPT | Mod: TC | Performed by: RADIOLOGY

## 2022-10-19 ENCOUNTER — HOSPITAL ENCOUNTER (OUTPATIENT)
Dept: ULTRASOUND IMAGING | Facility: CLINIC | Age: 47
Discharge: HOME | End: 2022-10-19
Attending: INTERNAL MEDICINE | Admitting: INTERNAL MEDICINE
Payer: COMMERCIAL

## 2022-10-19 DIAGNOSIS — E06.3 HASHIMOTO'S DISEASE: ICD-10-CM

## 2022-10-19 PROCEDURE — 76536 US EXAM OF HEAD AND NECK: CPT

## 2022-10-24 ENCOUNTER — HOSPITAL ENCOUNTER (OUTPATIENT)
Facility: CLINIC | Age: 47
End: 2022-10-24
Payer: COMMERCIAL

## 2022-10-25 ENCOUNTER — TELEPHONE (OUTPATIENT)
Dept: MEDSURG UNIT | Facility: CLINIC | Age: 47
End: 2022-10-25

## 2022-10-27 ENCOUNTER — HOSPITAL ENCOUNTER (OUTPATIENT)
Dept: ULTRASOUND IMAGING | Facility: CLINIC | Age: 47
Discharge: HOME | End: 2022-10-27
Attending: INTERNAL MEDICINE | Admitting: INTERNAL MEDICINE
Payer: COMMERCIAL

## 2022-10-27 DIAGNOSIS — E06.3 HASHIMOTO'S DISEASE: ICD-10-CM

## 2022-10-27 DIAGNOSIS — E04.1 THYROID NODULE: ICD-10-CM

## 2022-10-27 PROCEDURE — 10005 FNA BX W/US GDN 1ST LES: CPT

## 2022-10-27 PROCEDURE — 88173 CYTOPATH EVAL FNA REPORT: CPT | Mod: 26 | Performed by: PATHOLOGY

## 2022-10-27 PROCEDURE — 10006 FNA BX W/US GDN EA ADDL: CPT

## 2022-10-27 PROCEDURE — 88173 CYTOPATH EVAL FNA REPORT: CPT | Mod: TC | Performed by: INTERNAL MEDICINE

## 2022-10-27 PROCEDURE — 250N000009 HC RX 250: Performed by: RADIOLOGY

## 2022-10-27 RX ADMIN — LIDOCAINE HYDROCHLORIDE 10 ML: 10 INJECTION, SOLUTION EPIDURAL; INFILTRATION; INTRACAUDAL; PERINEURAL at 10:00

## 2022-10-31 LAB
PATH REPORT.COMMENTS IMP SPEC: NORMAL
PATH REPORT.FINAL DX SPEC: NORMAL
PATH REPORT.FINAL DX SPEC: NORMAL
PATH REPORT.GROSS SPEC: NORMAL
PATH REPORT.GROSS SPEC: NORMAL
PATH REPORT.MICROSCOPIC SPEC OTHER STN: NORMAL
PATH REPORT.MICROSCOPIC SPEC OTHER STN: NORMAL
PATH REPORT.RELEVANT HX SPEC: NORMAL
PATH REPORT.RELEVANT HX SPEC: NORMAL

## 2022-11-09 ENCOUNTER — TELEPHONE (OUTPATIENT)
Dept: OBGYN | Facility: CLINIC | Age: 47
End: 2022-11-09

## 2022-11-09 NOTE — TELEPHONE ENCOUNTER
8/20/2021 last annual w Dr Thompson    Pt asking about vaginal dryness; has not gotten her period this month but does have breast tenderness. Asking if she needs to be seen for this.  Recommended an in office visit and reminded her she is overdue for her annual visit as well. She can sit and talk about vaginal dryness and tx options after Dr Thompson evaluates. Pt willing to schedule.    Transferred to scheduling for annual and problem visit.    Collette York RN on 11/9/2022 at 1:36 PM

## 2022-12-26 ENCOUNTER — HEALTH MAINTENANCE LETTER (OUTPATIENT)
Age: 47
End: 2022-12-26

## 2023-01-18 ENCOUNTER — ANCILLARY PROCEDURE (OUTPATIENT)
Dept: MAMMOGRAPHY | Facility: CLINIC | Age: 48
End: 2023-01-18
Payer: COMMERCIAL

## 2023-01-18 DIAGNOSIS — Z12.31 VISIT FOR SCREENING MAMMOGRAM: ICD-10-CM

## 2023-01-18 PROCEDURE — 77067 SCR MAMMO BI INCL CAD: CPT | Mod: TC | Performed by: RADIOLOGY

## 2023-01-18 PROCEDURE — 77063 BREAST TOMOSYNTHESIS BI: CPT | Mod: TC | Performed by: RADIOLOGY

## 2023-01-23 NOTE — PROGRESS NOTES
"Eliza is a 47 year old  female who presents for annual exam.     Besides routine health maintenance, she has no other health concerns today .    HPI:  The patient's PCP is [facility].      Patient is being seen today for her annual physical exam.     Has now tried  3 different medications for her spondylitis. Her rheum changed the medications b/c they weren't seeing good results w/ the cosentyx so now on taltz and not really any different. Rheum said most people \"feel like a million bucks\" once on it but she def hasn't had that. Initial med was humira and felt for sure best on that but bumped her liver enzymes. Not sure how much though. Seeing rheum every 3 months so will ask. May be that felt best on humira b/c it was her first med and so it was better than having not been on anything for years prior. Overall def better on something than  Nothing but still deals with chronic back pain and needs nsaids but manageable.    Cycles are back to 24-25 days. Had a time the last few months where she was 2 weeks late for her period and did worry even though Paco had a vas. Will wear a tampon for the first 1-2 days and then wear a pad/liner until period is done for a couple more. Had really heavy periods for awhile and a couple of years ago had discussed mirena or some other intervention. Feels like other than closer together cycles it's not that heavy and declines any interventions. Denies vasomotor sx at all.     A few months ago, she had somewhat acute onset of vaginal dryness. Denies any itching or discharge. Occurred in general and not just during sex but also then.  Still feels dry and has learned to just deal with it though it seems slightly less intense compared to when it first started.     Mother had breast cancer postmenopausally at 52 as did PGM. Getting her 3D mammos.    Has a recent findings of  2 small right thyroid nodules. Seeing Dr Lincoln for this. TFTs normal. Recent U/S a few " "months ago and rec was annual f/up. One ws 1.5cm and other was 1.2  Noted them herself when laying on her left and could feel a small bump in her throat when she was laying down. Kept feeling like she wanted to cough it out but wouldn't help at all.  Went to her pcp who told her one of her nostrils is \"80% occluded\" and felt she had allergies. Went to allergist. 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 Tried flonase, azelastine and oral allergy meds but no change. Doesn't feel nasal sx at all. Denies any heart burn or gastritis at all. This is when imaging done and nodules found and currently her sx are being attributed to them though no intervention is recommended for them so her sx persist.     solyx sling several years ago and loves it. No MECHE at all and recommends it to all of her friends.     Did not take her lipid meds for about a year b/c ran out of Rx and then just forgot to f/up and fill Rx. Recently labs with PCP from 9/22 show LDL of 159. Back on lipitor now and tolerating it well. Hasn't had a lab repeat since starting back on consistently. Not fasting this AM    GYNECOLOGIC HISTORY:    Patient's last menstrual period was 01/03/2023 (exact date).    Regular menses? yes  Menses every 26 days.  Length of menses: 4 days    Her current contraception method is: none.  She  reports that she has never smoked. She has never used smokeless tobacco.    Patient is sexually active.  STD testing offered?  Declined  Last PHQ-9 score on record =   PHQ-9 SCORE 1/25/2023   PHQ-9 Total Score 0     Last GAD7 score on record =   RADHA-7 SCORE 1/25/2023   Total Score 0     Alcohol Score = 5    HEALTH MAINTENANCE:  Cholesterol: (No results found for: CHOL 11/2022? W/ Dr. Hermosillo   Last Mammo: 01/18/2023, Result: Normal, Next Mammo: 01/2024   Pap:   Lab Results   Component Value Date    PAP NIL, HPV NEG  07/25/2018     Colonoscopy:  Na, Result: Not applicable, Next Colonoscopy: " 47 years.  Dexa:  NA    Health maintenance updated:  Yes    HISTORY:  OB History    Para Term  AB Living   2 2 2 0 0 2   SAB IAB Ectopic Multiple Live Births   0 0 0 0 2      # Outcome Date GA Lbr Naresh/2nd Weight Sex Delivery Anes PTL Lv   2 Term 11/01/10 39w0d  3.402 kg (7 lb 8 oz) F Vag-Spont EPI  PITO      Birth Comments: Followed by Donna      Name: Freddie   1 Term 08 39w0d  3.459 kg (7 lb 10 oz) M Vag-Spont EPI  PITO      Birth Comments: Followed by Donna and Delivered by [facility]. severe back issues during pregnancy-SI joint injection with minimal relief done at 36 weeks      Name: Gerard       Patient Active Problem List   Diagnosis     Osteoarthritis of spine, unspecified spinal osteoarthritis complication status, unspecified spinal region     HLA-B27 positive arthropathy     Uses oral contraception     Hypercholesteremia     History of suburethral sling procedure     Nontoxic thyroid nodule     Chronic cough     Vaginal dryness     Past Surgical History:   Procedure Laterality Date     Bunion surgery       CYSTOSCOPY, SLING TRANSVAGINAL N/A 2020    Procedure: SLING OPERATION, FOR STRESS INCONTINENCE, WITHOUT ANTERIOR COLPORRHAPHY, CYSTOSCOPY;  Surgeon: Monica Thompson MD;  Location:  OR      Social History     Tobacco Use     Smoking status: Never     Smokeless tobacco: Never   Substance Use Topics     Alcohol use: Yes     Alcohol/week: 0.0 standard drinks      Problem (# of Occurrences) Relation (Name,Age of Onset)    Diabetes (1) Paternal Grandmother    Osteoporosis (1) Maternal Grandmother    Thyroid Disease (1) Mother    Breast Cancer (2) Mother (52): mom's dx'd at early stage, Paternal Grandmother    Hyperlipidemia (1) Father    Coronary Artery Disease (1) Paternal Grandmother    No Known Problems (5) Sister, Brother, Maternal Grandfather, Paternal Grandfather, Other            Current Outpatient Medications   Medication Sig     atorvastatin (LIPITOR) 40 MG tablet Take 40  "mg by mouth daily     naproxen (NAPROSYN) 500 MG tablet Take 500 mg by mouth 2 times daily (with meals)     TALTZ 80 MG/ML SOAJ auto-injector      No current facility-administered medications for this visit.     No Known Allergies    Past medical, surgical, social and family histories were reviewed and updated in EPIC.    ROS:   12 point review of systems negative other than symptoms noted below or in the HPI.  No urinary frequency or dysuria, bladder or kidney problems    EXAM:  /80   Ht 1.594 m (5' 2.75\")   Wt 85.5 kg (188 lb 6.4 oz)   LMP 01/03/2023 (Exact Date)   Breastfeeding No   BMI 33.64 kg/m     BMI: Body mass index is 33.64 kg/m .    PHYSICAL EXAM:  Constitutional:   Appearance: Well nourished, well developed, alert, in no acute distress  Neck:  Lymph Nodes:  No lymphadenopathy present    Thyroid:  Gland size normal, nontender, no nodularity or masses upon palpation.   Chest:  Respiratory Effort:  Breathing unlabored, CTA Bilaterally  Cardiovascular:    Heart: Auscultation:  Regular rate, normal rhythm, no murmurs present  Breasts: Inspection of Breasts:  No lymphadenopathy present., Palpation of Breasts and Axillae:  No masses present on palpation, no breast tenderness., Axillary Lymph Nodes:  No lymphadenopathy present. and No nodularity, asymmetry or nipple discharge bilaterally.  Gastrointestinal:   Abdominal Examination:  Abdomen nontender to palpation, tone normal without rigidity or guarding, no masses present, umbilicus without lesions   Liver and Spleen:  No hepatomegaly present, liver nontender to palpation    Hernias:  No hernias present  Lymphatic: Lymph Nodes:  No other lymphadenopathy present  Skin:  General Inspection:  No rashes present, no lesions present, no areas of  discoloration  Neurologic:    Mental Status:  Oriented X3.  Normal strength and tone, sensory exam                grossly normal, mentation intact and speech normal.    Psychiatric:   Mentation appears normal and " affect normal/bright.         Pelvic Exam:  External Genitalia:     Normal appearance for age, no discharge present, no tenderness present, no inflammatory lesions present, color normal  Vagina:     Normal vaginal vault with GENERAL VAGINAL WALL LAXITY ESPECIALLY ANTERIORLY BUT ONLY ABOUT GRADE 1 CYSTOCELE, MESH NOT FELT, NO EROSION OR ISSUES no discharge present, no inflammatory lesions present, no masses present  Bladder:     Nontender to palpation  Urethra:   Urethral Body:  Urethra palpation normal, urethra structural support normal   Urethral Meatus:  No erythema or lesions present  Cervix:     Appearance healthy, no lesions present, nontender to palpation, no bleeding present  Uterus:     Uterus: firm, normal sized and nontender, anteverted in position.   Adnexa:     No adnexal tenderness present, no adnexal masses present  Perineum:     Perineum within normal limits, no evidence of trauma, no rashes or skin lesions present  Anus:     Anus within normal limits, no hemorrhoids present  Inguinal Lymph Nodes:     No lymphadenopathy present  Pubic Hair:     Normal pubic hair distribution for age  Genitalia and Groin:     No rashes present, no lesions present, no areas of discoloration, no masses present      COUNSELING:   Reviewed preventive health counseling, as reflected in patient instructions  Special attention given to:        Immunizations    Declined: Covid-19, Influenza and TDAP today and wants to repeat them w/ her PCP.                (Mattie)menopause management    BMI: Body mass index is 33.64 kg/m .  Weight management plan: Discussed healthy diet and exercise guidelines    ASSESSMENT:  47 year old female with satisfactory annual exam.    ICD-10-CM    1. Encounter for gynecological examination without abnormal finding  Z01.419 Pap thin layer screen with HPV - recommended age 30 - 65 years      2. Screen for colon cancer  Z12.11 Colonoscopy Screening  Referral      3. HLA-B27 positive arthropathy   "M12.80       4. Hypercholesteremia  E78.00       5. History of suburethral sling procedure  Z98.890       6. Nontoxic thyroid nodule  E04.1       7. Chronic cough  R05.3       8. Vaginal dryness  N89.8           PLAN:  Pap/hpv done today.     Mammo done last week and normal     Defer fasting labs, lipids and spondylitis mgmt to PCP and rheum( [facility])  May be worth asking rheum how much she bumped her LFTs by when on humira b/c if very minimal then a lot of reason to think it could just have been mild fatty liver with weight, etc and if felt best on that one could consider return to it.     C.s referral sent to beatriz ricks as lives in [facility] and that is closest    Discussed her age and perimenopause and anovulatory cycles from stress, weight, exercise, etc vs closer together and heavier cycles that are more typical of age >40 and perimenopause.  Currently no menorrhagia and other than one delayed cycle they are regular and manageable even if short interval and no v.m sx    Discussed vaginal dryness and in setting of I.C as well and very likely due to declining E2 from age/perimenopause  Exam is normal and no other findings vaginally or vulva to account for sx   Discussed silicone based lubricant like uberlube during sexas well as coconut oil, vaseline, aquaphor, etc a   Discussed when/if estrace is indicated and could safely use it even as a hormone therapy with fhx of breast cancer and reviewed recent data on estrace even in personal breast cancer history pts  Is not to the level of need for estrace but will continue to monitor and let me know if sx persist/worsen    Discussed seeing ENT b/c her cough and \"lump in throat\" are much more likely related to post nasal drip whether allergy or chronic sinusitis than her 2 tiny nodules on her thyroid.  Could also do a 2 week trial of prilosec while waiting for ENT, to r/o reflux/gerd as a cause of her sx but sounds very c/w postnasal " drip/inflammation in oropharynx than either of the other two things.    Monica Thompson MD

## 2023-01-25 ENCOUNTER — OFFICE VISIT (OUTPATIENT)
Dept: OBGYN | Facility: CLINIC | Age: 48
End: 2023-01-25
Payer: COMMERCIAL

## 2023-01-25 VITALS
HEIGHT: 63 IN | SYSTOLIC BLOOD PRESSURE: 120 MMHG | BODY MASS INDEX: 33.38 KG/M2 | DIASTOLIC BLOOD PRESSURE: 80 MMHG | WEIGHT: 188.4 LBS

## 2023-01-25 DIAGNOSIS — Z98.890 HISTORY OF SUBURETHRAL SLING PROCEDURE: ICD-10-CM

## 2023-01-25 DIAGNOSIS — E78.00 HYPERCHOLESTEREMIA: ICD-10-CM

## 2023-01-25 DIAGNOSIS — N89.8 VAGINAL DRYNESS: ICD-10-CM

## 2023-01-25 DIAGNOSIS — R05.3 CHRONIC COUGH: ICD-10-CM

## 2023-01-25 DIAGNOSIS — M12.80 HLA-B27 POSITIVE ARTHROPATHY: ICD-10-CM

## 2023-01-25 DIAGNOSIS — Z12.11 SCREEN FOR COLON CANCER: ICD-10-CM

## 2023-01-25 DIAGNOSIS — E04.1 NONTOXIC THYROID NODULE: ICD-10-CM

## 2023-01-25 DIAGNOSIS — Z01.419 ENCOUNTER FOR GYNECOLOGICAL EXAMINATION WITHOUT ABNORMAL FINDING: Primary | ICD-10-CM

## 2023-01-25 PROCEDURE — 87624 HPV HI-RISK TYP POOLED RSLT: CPT | Performed by: OBSTETRICS & GYNECOLOGY

## 2023-01-25 PROCEDURE — 99396 PREV VISIT EST AGE 40-64: CPT | Performed by: OBSTETRICS & GYNECOLOGY

## 2023-01-25 PROCEDURE — G0145 SCR C/V CYTO,THINLAYER,RESCR: HCPCS | Performed by: OBSTETRICS & GYNECOLOGY

## 2023-01-25 RX ORDER — NAPROXEN 500 MG/1
500 TABLET ORAL 2 TIMES DAILY WITH MEALS
COMMUNITY
Start: 2022-12-16

## 2023-01-25 RX ORDER — ATORVASTATIN CALCIUM 40 MG/1
1 TABLET, FILM COATED ORAL
COMMUNITY
Start: 2022-12-19 | End: 2023-01-25

## 2023-01-25 RX ORDER — ATORVASTATIN CALCIUM 40 MG/1
40 TABLET, FILM COATED ORAL DAILY
COMMUNITY

## 2023-01-25 RX ORDER — IXEKIZUMAB 80 MG/ML
INJECTION, SOLUTION SUBCUTANEOUS
COMMUNITY
Start: 2023-01-20 | End: 2024-02-06

## 2023-01-25 RX ORDER — AZELASTINE 1 MG/ML
SPRAY, METERED NASAL
COMMUNITY
Start: 2022-09-23 | End: 2023-01-25

## 2023-01-25 RX ORDER — SECUKINUMAB 150 MG/ML
INJECTION SUBCUTANEOUS
COMMUNITY
Start: 2022-05-19 | End: 2023-01-25

## 2023-01-25 RX ORDER — FLUTICASONE PROPIONATE 50 MCG
1 SPRAY, SUSPENSION (ML) NASAL DAILY
COMMUNITY
Start: 2022-09-22 | End: 2023-01-25

## 2023-01-25 RX ORDER — OLOPATADINE HYDROCHLORIDE 665 UG/1
1 SPRAY NASAL 2 TIMES DAILY
COMMUNITY
Start: 2022-10-13 | End: 2023-01-25

## 2023-01-25 ASSESSMENT — ANXIETY QUESTIONNAIRES
5. BEING SO RESTLESS THAT IT IS HARD TO SIT STILL: NOT AT ALL
IF YOU CHECKED OFF ANY PROBLEMS ON THIS QUESTIONNAIRE, HOW DIFFICULT HAVE THESE PROBLEMS MADE IT FOR YOU TO DO YOUR WORK, TAKE CARE OF THINGS AT HOME, OR GET ALONG WITH OTHER PEOPLE: NOT DIFFICULT AT ALL
6. BECOMING EASILY ANNOYED OR IRRITABLE: NOT AT ALL
7. FEELING AFRAID AS IF SOMETHING AWFUL MIGHT HAPPEN: NOT AT ALL
GAD7 TOTAL SCORE: 0
3. WORRYING TOO MUCH ABOUT DIFFERENT THINGS: NOT AT ALL
2. NOT BEING ABLE TO STOP OR CONTROL WORRYING: NOT AT ALL
GAD7 TOTAL SCORE: 0
1. FEELING NERVOUS, ANXIOUS, OR ON EDGE: NOT AT ALL

## 2023-01-25 ASSESSMENT — PATIENT HEALTH QUESTIONNAIRE - PHQ9
5. POOR APPETITE OR OVEREATING: NOT AT ALL
SUM OF ALL RESPONSES TO PHQ QUESTIONS 1-9: 0

## 2023-01-26 PROBLEM — E04.1 NONTOXIC THYROID NODULE: Status: ACTIVE | Noted: 2023-01-26

## 2023-01-26 PROBLEM — R05.3 CHRONIC COUGH: Status: ACTIVE | Noted: 2023-01-26

## 2023-01-26 PROBLEM — Z98.890 HISTORY OF SUBURETHRAL SLING PROCEDURE: Status: ACTIVE | Noted: 2017-06-21

## 2023-01-26 PROBLEM — N89.8 VAGINAL DRYNESS: Status: ACTIVE | Noted: 2023-01-26

## 2023-01-29 LAB
BKR LAB AP GYN ADEQUACY: NORMAL
BKR LAB AP GYN INTERPRETATION: NORMAL
BKR LAB AP HPV REFLEX: NORMAL
BKR LAB AP PREVIOUS ABNORMAL: NORMAL
PATH REPORT.COMMENTS IMP SPEC: NORMAL
PATH REPORT.COMMENTS IMP SPEC: NORMAL
PATH REPORT.RELEVANT HX SPEC: NORMAL

## 2023-01-31 LAB
HUMAN PAPILLOMA VIRUS 16 DNA: NEGATIVE
HUMAN PAPILLOMA VIRUS 18 DNA: NEGATIVE
HUMAN PAPILLOMA VIRUS FINAL DIAGNOSIS: NORMAL
HUMAN PAPILLOMA VIRUS OTHER HR: NEGATIVE

## 2023-04-25 ENCOUNTER — TELEPHONE (OUTPATIENT)
Dept: GASTROENTEROLOGY | Facility: CLINIC | Age: 48
End: 2023-04-25
Payer: COMMERCIAL

## 2023-04-25 NOTE — TELEPHONE ENCOUNTER
Screening Questions  BLUE  KIND OF PREP RED  LOCATION [review exclusion criteria] GREEN  SEDATION TYPE        y Are you active on mychart?       Monica Thompson MD in [facility] Ordering/Referring Provider?        BCBS What type of coverage do you have?      n Have you had a positive covid test in the last 14 days?     31.4 1. BMI  [BMI 40+ - review exclusion criteria& smart-phrase document]    y  2. Are you able to give consent for your medical care? [IF NO,RN REVIEW]          n  3. Are you taking any prescription pain medications on a routine schedule   (ex narcotics: oxycodone, roxicodone, oxycontin,  and percocet)? [RN Review]        n  3a. EXTENDED PREP What kind of prescription?     n 4. Do you have any chemical dependencies such as alcohol, street drugs, or methadone?        **If yes 3- 5 , please schedule with MAC sedation.**          IF YES TO ANY 6 - 10 - HOSPITAL SETTING ONLY.     n 6.   Do you need assistance transferring?     n 7.   Have you had a heart or lung transplant?    n 8.   Are you currently on dialysis?   n 9.   Do you use daily home oxygen?   n 10. Do you take nitroglycerin?   10a. n If yes, how often?     11. [FEMALES]  n Are you currently pregnant?    11a. n If yes, how many weeks? [ Greater than 12 weeks, OR NEEDED]    n 12. Do you have Pulmonary Hypertension? *NEED PAC APPT AT [facility] w/ [facility]*     n 13. [review exclusion criteria]  Do you have any implantable devices in your body (pacemaker, defib, LVAD)?    n 14. In the past 6 months, have you had any heart related issues including cardiomyopathy or heart attack?     14a. n If yes, did it require cardiac stenting if so when?     n 15. Have you had a stroke or Transient ischemic attack (TIA - aka  mini stroke ) within 6 months?      n 16. Do you have mod to severe Obstructive Sleep Apnea?  [Hospital only]    n 17. Do you have SEVERE AND UNCONTROLLED asthma? *NEED PAC APPT AT [facility] w/MAC*     18. Are you currently taking any blood thinners?  "    18a. No. Continue to 19.   18b. Yes/no Blood Thinner: No [CONTINUE TO #19]    n 19. Do you take the medication Phentermine?    19a. If yes, \"Hold for 7 days before procedure.  Please consult your prescribing provider if you have questions about holding this medication.\"     n  20. Do you have chronic kidney disease?      n  21. Do you have a diagnosis of diabetes?     n  22. On a regular basis do you go 3-5 days between bowel movements?      23. Preferred LOCAL Pharmacy for Pre Prescription    [ LIST ONLY ONE PHARMACY]     [id number] - [place], [place] - [address] AT [facility] OF [id number]        - CLOSING REMINDERS -    Informed patient they will need an adult    Cannot take any type of public or medical transportation alone    Conscious Sedation- Needs  for 6 hours after the procedure       MAC/General-Needs  for 24 hours after procedure    Pre-Procedure Covid test to be completed [[facility] PCR Testing Required]    Confirmed Nurse will call to complete assessment       - SCHEDULING DETAILS -  n Hospital Setting Required? If yes, what is the exclusion?: ilia Heredia  Surgeon    07/27/2023  Date of Procedure  Lower Endoscopy [Colonoscopy]  Type of Procedure Scheduled  [facility] Location   [facility]- If you answer yes to questions #8, #20, #21 [  pts ]Which Colonoscopy Prep was Sent?     moderate Sedation Type     n PAC / Pre-op Required                 "

## 2023-07-05 RX ORDER — BISACODYL 5 MG/1
TABLET, DELAYED RELEASE ORAL
Qty: 4 TABLET | Refills: 0 | Status: SHIPPED | OUTPATIENT
Start: 2023-07-05 | End: 2024-02-06

## 2023-07-27 ENCOUNTER — HOSPITAL ENCOUNTER (OUTPATIENT)
Facility: CLINIC | Age: 48
Discharge: HOME | End: 2023-07-27
Attending: INTERNAL MEDICINE | Admitting: INTERNAL MEDICINE
Payer: COMMERCIAL

## 2023-07-27 VITALS
OXYGEN SATURATION: 96 % | RESPIRATION RATE: 18 BRPM | DIASTOLIC BLOOD PRESSURE: 54 MMHG | SYSTOLIC BLOOD PRESSURE: 102 MMHG | HEART RATE: 62 BPM

## 2023-07-27 DIAGNOSIS — Z12.11 SPECIAL SCREENING FOR MALIGNANT NEOPLASMS, COLON: Primary | ICD-10-CM

## 2023-07-27 LAB — COLONOSCOPY: NORMAL

## 2023-07-27 PROCEDURE — G0500 MOD SEDAT ENDO SERVICE >5YRS: HCPCS | Performed by: INTERNAL MEDICINE

## 2023-07-27 PROCEDURE — G0121 COLON CA SCRN NOT HI RSK IND: HCPCS | Performed by: INTERNAL MEDICINE

## 2023-07-27 PROCEDURE — 45378 DIAGNOSTIC COLONOSCOPY: CPT | Performed by: INTERNAL MEDICINE

## 2023-07-27 PROCEDURE — 250N000011 HC RX IP 250 OP 636: Performed by: INTERNAL MEDICINE

## 2023-07-27 RX ORDER — NALOXONE HYDROCHLORIDE 0.4 MG/ML
0.2 INJECTION, SOLUTION INTRAMUSCULAR; INTRAVENOUS; SUBCUTANEOUS
Status: DISCONTINUED | OUTPATIENT
Start: 2023-07-27 | End: 2023-07-27 | Stop reason: HOSPADM

## 2023-07-27 RX ORDER — ONDANSETRON 4 MG/1
4 TABLET, ORALLY DISINTEGRATING ORAL EVERY 6 HOURS PRN
Status: DISCONTINUED | OUTPATIENT
Start: 2023-07-27 | End: 2023-07-27 | Stop reason: HOSPADM

## 2023-07-27 RX ORDER — EPINEPHRINE 1 MG/ML
0.1 INJECTION, SOLUTION INTRAMUSCULAR; SUBCUTANEOUS
Status: DISCONTINUED | OUTPATIENT
Start: 2023-07-27 | End: 2023-07-27 | Stop reason: HOSPADM

## 2023-07-27 RX ORDER — FLUMAZENIL 0.1 MG/ML
0.2 INJECTION, SOLUTION INTRAVENOUS
Status: DISCONTINUED | OUTPATIENT
Start: 2023-07-27 | End: 2023-07-27 | Stop reason: HOSPADM

## 2023-07-27 RX ORDER — DIPHENHYDRAMINE HYDROCHLORIDE 50 MG/ML
25-50 INJECTION INTRAMUSCULAR; INTRAVENOUS
Status: DISCONTINUED | OUTPATIENT
Start: 2023-07-27 | End: 2023-07-27 | Stop reason: HOSPADM

## 2023-07-27 RX ORDER — FENTANYL CITRATE 50 UG/ML
50-100 INJECTION, SOLUTION INTRAMUSCULAR; INTRAVENOUS EVERY 5 MIN PRN
Status: DISCONTINUED | OUTPATIENT
Start: 2023-07-27 | End: 2023-07-27 | Stop reason: HOSPADM

## 2023-07-27 RX ORDER — PROCHLORPERAZINE MALEATE 10 MG
10 TABLET ORAL EVERY 6 HOURS PRN
Status: DISCONTINUED | OUTPATIENT
Start: 2023-07-27 | End: 2023-07-27 | Stop reason: HOSPADM

## 2023-07-27 RX ORDER — NALOXONE HYDROCHLORIDE 0.4 MG/ML
0.4 INJECTION, SOLUTION INTRAMUSCULAR; INTRAVENOUS; SUBCUTANEOUS
Status: DISCONTINUED | OUTPATIENT
Start: 2023-07-27 | End: 2023-07-27 | Stop reason: HOSPADM

## 2023-07-27 RX ORDER — ONDANSETRON 2 MG/ML
4 INJECTION INTRAMUSCULAR; INTRAVENOUS
Status: DISCONTINUED | OUTPATIENT
Start: 2023-07-27 | End: 2023-07-27 | Stop reason: HOSPADM

## 2023-07-27 RX ORDER — LIDOCAINE 40 MG/G
CREAM TOPICAL
Status: DISCONTINUED | OUTPATIENT
Start: 2023-07-27 | End: 2023-07-27 | Stop reason: HOSPADM

## 2023-07-27 RX ORDER — ATROPINE SULFATE 0.1 MG/ML
1 INJECTION INTRAVENOUS
Status: DISCONTINUED | OUTPATIENT
Start: 2023-07-27 | End: 2023-07-27 | Stop reason: HOSPADM

## 2023-07-27 RX ORDER — ONDANSETRON 2 MG/ML
4 INJECTION INTRAMUSCULAR; INTRAVENOUS EVERY 6 HOURS PRN
Status: DISCONTINUED | OUTPATIENT
Start: 2023-07-27 | End: 2023-07-27 | Stop reason: HOSPADM

## 2023-07-27 RX ORDER — SIMETHICONE 40MG/0.6ML
133 SUSPENSION, DROPS(FINAL DOSAGE FORM)(ML) ORAL
Status: DISCONTINUED | OUTPATIENT
Start: 2023-07-27 | End: 2023-07-27 | Stop reason: HOSPADM

## 2023-07-27 RX ADMIN — FENTANYL CITRATE 100 MCG: 50 INJECTION, SOLUTION INTRAMUSCULAR; INTRAVENOUS at 07:38

## 2023-07-27 RX ADMIN — MIDAZOLAM 2 MG: 1 INJECTION INTRAMUSCULAR; INTRAVENOUS at 07:38

## 2023-07-27 ASSESSMENT — ACTIVITIES OF DAILY LIVING (ADL): ADLS_ACUITY_SCORE: 33

## 2023-07-27 NOTE — DISCHARGE INSTRUCTIONS
HR=83 bpm, ASQL=403/70 mmhg, SpO2=96.0 %, Resp=12 B/min, EtCO2=38 mmHg, Apnea=2 Seconds, Perez=2 The patient has received a copy of the Provation report the doctor has written and discharge instructions have been discussed with the patient and responsible adult.  All questions were addressed and answered prior to patient discharge.

## 2023-07-27 NOTE — H&P
Pre-Endoscopy History and Physical     Eliza Nascimento MRN# [id number]   YOB: 1975 Age: 48 year old     Date of Procedure: 7/27/2023  Primary care provider: [facility]  Type of Endoscopy: Colonoscopy with possible biopsy, possible polypectomy  Reason for Procedure: screen  Type of Anesthesia Anticipated: Conscious Sedation    HPI:    Eliza is a 48 year old female who will be undergoing the above procedure.      A history and physical has been performed. The patient's medications and allergies have been reviewed. The risks and benefits of the procedure and the sedation options and risks were discussed with the patient.  All questions were answered and informed consent was obtained.      She denies a personal or family history of anesthesia complications or bleeding disorders.     Patient Active Problem List   Diagnosis    Osteoarthritis of spine, unspecified spinal osteoarthritis complication status, unspecified spinal region    HLA-B27 positive arthropathy    Uses oral contraception    Hypercholesteremia    History of suburethral sling procedure    Nontoxic thyroid nodule    Chronic cough    Vaginal dryness    Screening for cervical cancer        Past Medical History:   Diagnosis Date    Allergic rhinitis due to pollen     Chronic cough 1/26/2023    History of suburethral sling procedure 6/21/2017    Hyperlipidemia, unspecified hyperlipidemia type 06/16/2016    Infertility counseling     pt. with PCOS and  with only one testicle and semen analysis shows decreased normal motility and shape but normal counts-saw urology-no tx done/available. clomid x4 with metformin and IUIx2 w/o pregnancy then conceived on own when took month off    PCOS (polycystic ovarian syndrome)     Spondylosis     +HLA-27. sees Dr. Eller of Rheum and takes rx naproxen BID    Stress incontinence     Vaginal dryness 1/26/2023        Past Surgical History:   Procedure Laterality Date    Abi  surgery  2003    CYSTOSCOPY, SLING TRANSVAGINAL N/A 8/25/2020    Procedure: SLING OPERATION, FOR STRESS INCONTINENCE, WITHOUT ANTERIOR COLPORRHAPHY, CYSTOSCOPY;  Surgeon: Monica Thompson MD;  Location:  OR       Social History     Tobacco Use    Smoking status: Never    Smokeless tobacco: Never   Substance Use Topics    Alcohol use: Yes     Comment: 0cca       Family History   Problem Relation Age of Onset    Breast Cancer Mother 52        mom's dx'd at early stage    Thyroid Disease Mother     Hyperlipidemia Father     Osteoporosis Maternal Grandmother     No Known Problems Maternal Grandfather     Breast Cancer Paternal Grandmother     Diabetes Paternal Grandmother     Coronary Artery Disease Paternal Grandmother     No Known Problems Paternal Grandfather     No Known Problems Brother     No Known Problems Sister     No Known Problems Other     Colon Cancer No family hx of        Prior to Admission medications    Medication Sig Start Date End Date Taking? Authorizing Provider   atorvastatin (LIPITOR) 40 MG tablet Take 40 mg by mouth daily   Yes Reported, Patient   bisacodyl (DULCOLAX) 5 MG EC tablet Take 2 tablets at 3 pm the day before your procedure. If your procedure is before 11 am, take 2 additional tablets at 11 pm. If your procedure is after 11 am, take 2 additional tablets at 6 am. For additional instructions refer to your colonoscopy prep instructions. 7/5/23  Yes Dakota Heredia MD   naproxen (NAPROSYN) 500 MG tablet Take 500 mg by mouth 2 times daily (with meals) 12/16/22  Yes Reported, Patient   polyethylene glycol (GOLYTELY) 236 g suspension The night before the exam at 6 pm drink an 8-ounce glass every 15 minutes until the jug is half empty. If you arrive before 11 AM: Drink the other half of the Golytely jug at 11 PM night before procedure. If you arrive after 11 AM: Drink the other half of the Golytely jug at 6 AM day of procedure. For additional instructions refer to your colonoscopy prep  "instructions. 7/5/23  Yes Dakota Heredia MD   TALTZ 80 MG/ML SOAJ auto-injector  1/20/23  Yes Reported, Patient       No Known Allergies     REVIEW OF SYSTEMS:   5 point ROS negative except as noted above in HPI, including Gen., Resp., CV, GI &  system review.    PHYSICAL EXAM:   There were no vitals taken for this visit. Estimated body mass index is 33.64 kg/m  as calculated from the following:    Height as of 1/25/23: 1.594 m (5' 2.75\").    Weight as of 1/25/23: 85.5 kg (188 lb 6.4 oz).   GENERAL APPEARANCE: alert, and oriented  MENTAL STATUS: alert  AIRWAY EXAM: Mallampatti Class I (visualization of the soft palate, fauces, uvula, anterior and posterior pillars)  RESP: lungs clear to auscultation - no rales, rhonchi or wheezes  CV: regular rates and rhythm  DIAGNOSTICS:    Not indicated    IMPRESSION   ASA Class 2 - Mild systemic disease    PLAN:   Plan for Colonoscopy with possible biopsy, possible polypectomy. We discussed the risks, benefits and alternatives and the patient wished to proceed.    The above has been forwarded to the consulting provider.      Signed Electronically by: Dakota Heredia MD  July 27, 2023          "

## 2023-11-08 ENCOUNTER — HOSPITAL ENCOUNTER (OUTPATIENT)
Dept: ULTRASOUND IMAGING | Facility: CLINIC | Age: 48
Discharge: HOME | End: 2023-11-08
Attending: INTERNAL MEDICINE | Admitting: INTERNAL MEDICINE
Payer: COMMERCIAL

## 2023-11-08 DIAGNOSIS — E04.1 THYROID NODULE: ICD-10-CM

## 2023-11-08 PROCEDURE — 76536 US EXAM OF HEAD AND NECK: CPT

## 2024-02-06 PROBLEM — Z98.890 HISTORY OF SUBURETHRAL SLING PROCEDURE: Status: RESOLVED | Noted: 2017-06-21 | Resolved: 2024-02-06

## 2024-02-06 RX ORDER — OLOPATADINE HYDROCHLORIDE 665 UG/1
1 SPRAY NASAL 2 TIMES DAILY
COMMUNITY
Start: 2023-05-15 | End: 2024-02-07

## 2024-02-06 RX ORDER — AZELASTINE 1 MG/ML
SPRAY, METERED NASAL
COMMUNITY
Start: 2023-05-15 | End: 2024-02-07

## 2024-02-06 RX ORDER — POLYETHYLENE GLYCOL 3350, SODIUM CHLORIDE, SODIUM BICARBONATE, POTASSIUM CHLORIDE 420; 11.2; 5.72; 1.48 G/4L; G/4L; G/4L; G/4L
POWDER, FOR SOLUTION ORAL
COMMUNITY
Start: 2023-07-05

## 2024-02-06 NOTE — PROGRESS NOTES
"Eliza is a 48 year old  female who presents for annual exam.     Besides routine health maintenance, she has no other health concerns today .    HPI:  The patient's PCP is [facility].     Pt presents for her GYN annual exam.     Has now tried 4 different medications for her spondylitis. Her rheum (Dewey DelvalleEller) changed the medications b/c they weren't seeing good results w/ the Cosentyx or Taltz and not really any different. Rheum said most people \"feel like a million bucks\" once on it but she def hasn't had that. Initial med was humira and felt for sure best on that but bumped her liver enzymes. May be that felt best on humira b/c it was her first med and so it was better than having not been on anything for years prior.     Since last seen  switched to Symponi Aria (Golimubab) which is an injection every 3 months and is due for another 24. Joint pain is now localized to her back but in previous years, it was global, affecting every area of her body so is definitely better than that and can do normal activities and stay active with kids and before starting meds was not able to do that     Last year cycles were 24-28 days and now have become 21-49 days since starting Zepbound . Periods themselves are manageable, not overly heavy or painful. However, at this time last year she had another 49 day stretch of amenorrhea. Denied any vasomotor sx at all as she continues to \"live in her heated vest\". Had no information about her mothers transition.Paco had a vas for their contraception and had follow-up appt.  had discussed a mirena IUD a couple years ago when having menorrhagia but just really didn't like the idea of hormones at all and IUD specifically and feels like other than being not predictable timing they aren't as heavy as they were so tolerable    Solyx sling several years ago and loves it. No MECHE at all and recommends it to all of her friends.      Mother had breast " "cancer postmenopausally at 52 as did PGM. Getting her 3D mammos.    2 months ago, pt was started on Zepbound at 2.5mg, then 5mg for a month, and just started 7.5mg. In the past year, she has lost 13#. Had no issues getting it covered by insurance. Main side effect is acid reflux, very occasional mild nausea, and otherwise, tolerates it well.      Labs with PCP from  show LDL of 159. Back on lipitor now and tolerating it well. Hasn't had a lab repeat since starting back on consistently.     Is due to seen rheum and PCP soon.  Thought with weight loss she would be able to stop her statin but sees that she does need it. Tolerates it fine and no s.e just hates that she has to take meds when in general she really tries to be a minimalist with those things    Mother has just been dx'd supranuclear palsy.     GYNECOLOGIC HISTORY:    Patient's last menstrual period was 2024 (exact date).    Regular menses? no  Menses every 21-40 days.  Length of menses: 5 days    Her current contraception method is: none.  She  reports that she has never smoked. She has never used smokeless tobacco.    Patient is sexually active.  STD testing offered?  Declined  Last PHQ-9 score on record =       2024     1:08 PM   PHQ-9 SCORE   PHQ-9 Total Score 0     Last GAD7 score on record =       2024     1:08 PM   RADHA-7 SCORE   Total Score 0     Alcohol Score = 4    HEALTH MAINTENANCE:  Cholesterol: (No results found for: \"CHOL\" every 3 months  Last Mammo: One year ago, Result: Normal, Next Mammo: Today     Pap:   Lab Results   Component Value Date    GYNINTERP  2023     Negative for Intraepithelial Lesion or Malignancy (NILM)    PAP NIL 2018     Colonoscopy:  23, Result: Normal, Next Colonoscopy: 10 years.  Dexa:  NA    Health maintenance updated:  Yes     HISTORY:  OB History    Para Term  AB Living   2 2 2 0 0 2   SAB IAB Ectopic Multiple Live Births   0 0 0 0 2      # Outcome Date GA Lbr Naresh/2nd " Weight Sex Delivery Anes PTL Lv   2 Term 11/01/10 39w0d  3.402 kg (7 lb 8 oz) F Vag-Spont EPI  PITO      Birth Comments: Followed by Donna      Name: Freddie   1 Term 02/02/08 39w0d  3.459 kg (7 lb 10 oz) M Vag-Spont EPI  PITO      Birth Comments: Followed by Donna and Delivered by [facility]. severe back issues during pregnancy-SI joint injection with minimal relief done at 36 weeks      Name: Gerard       Patient Active Problem List   Diagnosis    Osteoarthritis of spine, unspecified spinal osteoarthritis complication status, unspecified spinal region    HLA-B27 positive arthropathy    Hypercholesteremia    Nontoxic thyroid nodule    Chronic cough    Vaginal dryness    Screening for cervical cancer    Ankylosing spondylitis (H)    Class 1 obesity due to excess calories with serious comorbidity and body mass index (BMI) of 31.0 to 31.9 in adult     Past Surgical History:   Procedure Laterality Date    Bunion surgery  2003    COLONOSCOPY N/A 7/27/2023    Procedure: Colonoscopy;  Surgeon: Dakota Heredia MD;  Location:  GI    CYSTOSCOPY, SLING TRANSVAGINAL N/A 8/25/2020    Procedure: SLING OPERATION, FOR STRESS INCONTINENCE, WITHOUT ANTERIOR COLPORRHAPHY, CYSTOSCOPY;  Surgeon: Monica Thompson MD;  Location:  OR      Social History     Tobacco Use    Smoking status: Never    Smokeless tobacco: Never   Substance Use Topics    Alcohol use: Yes     Comment: 0cca      Problem (# of Occurrences) Relation (Name,Age of Onset)    Diabetes (1) Paternal Grandmother    Neurologic Disorder (1) Mother: supranucleur nerological palsy    Osteoporosis (1) Maternal Grandmother    Thyroid Disease (1) Mother    Breast Cancer (2) Mother (52): mom's dx'd at early stage, Paternal Grandmother    Hyperlipidemia (1) Father    Coronary Artery Disease (1) Paternal Grandmother    No Known Problems (5) Sister, Brother, Maternal Grandfather, Paternal Grandfather, Other           Negative family history of: Colon Cancer              Current  "Outpatient Medications   Medication Sig    atorvastatin (LIPITOR) 40 MG tablet Take 40 mg by mouth daily    golimumab (SIMPONI ARIA) 50 MG/4ML injection Inject into the vein once every eight weeks    polyethylene glycol-electrolytes (NULYTELY) 420 g solution     ZEPBOUND 2.5 MG/0.5ML prefilled pen ADMINISTER 2.5 MG UNDER THE SKIN 1 TIME WEEKLY    ZEPBOUND 5 MG/0.5ML prefilled pen INJECT 5 MG SUBCUTANEOUSLY ONCE WEEKLY    ZEPBOUND 7.5 MG/0.5ML prefilled pen ADMINISTER 7.5 MG UNDER THE SKIN 1 TIME WEEKLY FOR 4 WEEKS    naproxen (NAPROSYN) 500 MG tablet Take 500 mg by mouth 2 times daily (with meals)     No current facility-administered medications for this visit.     No Known Allergies    Past medical, surgical, social and family histories were reviewed and updated in EPIC.    EXAM:  /56   Ht 1.594 m (5' 2.75\")   Wt 79.5 kg (175 lb 3.2 oz)   LMP 02/03/2024 (Exact Date)   Breastfeeding No   BMI 31.28 kg/m     BMI: Body mass index is 31.28 kg/m .    PHYSICAL EXAM:  Constitutional:   Appearance: Well nourished, well developed, alert, in no acute distress  Neck:  Lymph Nodes:  No lymphadenopathy present    Thyroid:  Gland size normal, nontender, no nodules or masses present  on palpation  Chest:  Respiratory Effort:  Breathing unlabored, CTA B  Cardiovascular:    Heart: Auscultation:  Regular rate, normal rhythm, no murmurs present  Breasts: Inspection of Breasts:  No lymphadenopathy present., Palpation of Breasts and Axillae:  No masses present on palpation, no breast tenderness., Axillary Lymph Nodes:  No lymphadenopathy present., and No nodularity, asymmetry or nipple discharge bilaterally.  Gastrointestinal:   Abdominal Examination:  Abdomen nontender to palpation, tone normal without rigidity or guarding, no masses present, umbilicus without lesions   Liver and Spleen:  No hepatomegaly present, liver nontender to palpation    Hernias:  No hernias present  Lymphatic: Lymph Nodes:  No other lymphadenopathy " present  Skin:  General Inspection:  No rashes present, no lesions present, no areas of  discoloration  Neurologic:    Mental Status:  Oriented X3.  Normal strength and tone, sensory exam                grossly normal, mentation intact and speech normal.    Psychiatric:   Mentation appears normal and affect normal/bright.         Pelvic Exam:  External Genitalia:     Normal appearance for age, no discharge present, no tenderness present, no inflammatory lesions present, color normal  Vagina:     Normal vaginal vault without central or paravaginal defects, no discharge present, no inflammatory lesions present, no masses present  Bladder:     Nontender to palpation  Urethra:   Urethral Body:  Urethra palpation normal, urethra structural support normal   Urethral Meatus:  No erythema or lesions present  Cervix:     Appearance healthy, no lesions present, nontender to palpation, no bleeding present  Uterus:     Uterus: firm, normal sized and nontender, anteverted in position.   Adnexa:     No adnexal tenderness present, no adnexal masses present  Perineum:     Perineum within normal limits, no evidence of trauma, no rashes or skin lesions present  Anus:     Anus within normal limits, no hemorrhoids present  Inguinal Lymph Nodes:     No lymphadenopathy present  Pubic Hair:     Normal pubic hair distribution for age  Genitalia and Groin:     No rashes present, no lesions present, no areas of discoloration, no masses present    COUNSELING:   Reviewed preventive health counseling, as reflected in patient instructions       Regular exercise       Healthy diet/nutrition       Immunizations  Declined: Covid-19 and Influenza due to Conscientious objector             Contraception       Consider Hep C screening for all patients one time for ages 18-79 years       HIV screeninx in teen years, 1x in adult years, and at intervals if high risk       (Mattie)menopause management    BMI: Body mass index is 31.28 kg/m .  Weight  management plan: Discussed healthy diet and exercise guidelines Specific weight management program called Zepbound discussed    ASSESSMENT:  48 year old female with satisfactory annual exam.    ICD-10-CM    1. Encounter for gynecological examination without abnormal finding  Z01.419       2. Hypercholesteremia  E78.00       3. Ankylosing spondylitis of multiple sites in spine (H)  M45.0       4. HLA-B27 positive arthropathy  M12.80       5. Nontoxic thyroid nodule  E04.1       6. Class 1 obesity due to excess calories with serious comorbidity and body mass index (BMI) of 31.0 to 31.9 in adult  E66.09     Z68.31           PLAN:    Pap is UTD for 2 more years and was NIL/NEG Jan 2023.   Can not follow routine ASCCP guidelines - will complete q3  vs q5 yr paps d/t immunosuppressive medication.     Mammo today.    C.S UTD for 9.5 more years - due 7/33.    Fasting labs deferred to PCP.  Also due for  both, Hep C/HIV screening so will defer this testing to PCP as well.   The patient is showing that she is due for Hepatitis B vaccination.   Should consider antibody  testing to see if showing immunity or not and then based on that could  recommend if Hepatitis B vaccination is actually needed or not.  She does not tend to do vaccines but is open to a tdap but needs to time it to not be so close to her next symponi aria so will return and time it between her doses    Additional health issues addressed at today's visit include:    Reviewed pros/cons of weight loss medications, health impacts from obesity especially since hypercholesterolemia on a statin at a young age already  Also discussed age 40+ and perimenopause and impacts on metabolic rate, focus, attention, energy, etc  Encouraged to keep up the good work with medication as adjunct     Discussed changes in menstrual cycles and v.m sx she could expect in the next few years  If has frequent or heavy periods, vs what is more rarely 21 or 49 days and more so 25 and not as  heavy as it once was, then should contact me to make sure it wouldn't contribute to anemia and iron deficiency in addition to just overall QOL    Known thyroid nodules and having f/up with Dr. Lincoln at [facility] endo clinic, most recently in November. Defer that mgmt to her    Monica Thompson MD

## 2024-02-07 ENCOUNTER — ANCILLARY PROCEDURE (OUTPATIENT)
Dept: MAMMOGRAPHY | Facility: CLINIC | Age: 49
End: 2024-02-07
Payer: COMMERCIAL

## 2024-02-07 ENCOUNTER — OFFICE VISIT (OUTPATIENT)
Dept: OBGYN | Facility: CLINIC | Age: 49
End: 2024-02-07
Payer: COMMERCIAL

## 2024-02-07 VITALS
DIASTOLIC BLOOD PRESSURE: 56 MMHG | BODY MASS INDEX: 31.04 KG/M2 | SYSTOLIC BLOOD PRESSURE: 100 MMHG | WEIGHT: 175.2 LBS | HEIGHT: 63 IN

## 2024-02-07 DIAGNOSIS — M12.80 HLA-B27 POSITIVE ARTHROPATHY: ICD-10-CM

## 2024-02-07 DIAGNOSIS — E66.09 CLASS 1 OBESITY DUE TO EXCESS CALORIES WITH SERIOUS COMORBIDITY AND BODY MASS INDEX (BMI) OF 31.0 TO 31.9 IN ADULT: ICD-10-CM

## 2024-02-07 DIAGNOSIS — E66.811 CLASS 1 OBESITY DUE TO EXCESS CALORIES WITH SERIOUS COMORBIDITY AND BODY MASS INDEX (BMI) OF 31.0 TO 31.9 IN ADULT: ICD-10-CM

## 2024-02-07 DIAGNOSIS — E04.1 NONTOXIC THYROID NODULE: ICD-10-CM

## 2024-02-07 DIAGNOSIS — Z01.419 ENCOUNTER FOR GYNECOLOGICAL EXAMINATION WITHOUT ABNORMAL FINDING: Primary | ICD-10-CM

## 2024-02-07 DIAGNOSIS — Z12.31 VISIT FOR SCREENING MAMMOGRAM: ICD-10-CM

## 2024-02-07 DIAGNOSIS — E78.00 HYPERCHOLESTEREMIA: ICD-10-CM

## 2024-02-07 DIAGNOSIS — M45.0 ANKYLOSING SPONDYLITIS OF MULTIPLE SITES IN SPINE (H): ICD-10-CM

## 2024-02-07 PROCEDURE — 99396 PREV VISIT EST AGE 40-64: CPT | Performed by: OBSTETRICS & GYNECOLOGY

## 2024-02-07 PROCEDURE — 77067 SCR MAMMO BI INCL CAD: CPT | Mod: TC | Performed by: RADIOLOGY

## 2024-02-07 PROCEDURE — 77063 BREAST TOMOSYNTHESIS BI: CPT | Mod: TC | Performed by: RADIOLOGY

## 2024-02-07 RX ORDER — TIRZEPATIDE 7.5 MG/.5ML
INJECTION, SOLUTION SUBCUTANEOUS
COMMUNITY
Start: 2024-01-29

## 2024-02-07 RX ORDER — TIRZEPATIDE 5 MG/.5ML
INJECTION, SOLUTION SUBCUTANEOUS
COMMUNITY
Start: 2023-12-28

## 2024-02-07 RX ORDER — GOLIMUMAB 50 MG/4ML
SOLUTION INTRAVENOUS
COMMUNITY
Start: 2023-11-17

## 2024-02-07 RX ORDER — TIRZEPATIDE 2.5 MG/.5ML
INJECTION, SOLUTION SUBCUTANEOUS
COMMUNITY
Start: 2023-12-07

## 2024-02-07 ASSESSMENT — ANXIETY QUESTIONNAIRES
GAD7 TOTAL SCORE: 0
IF YOU CHECKED OFF ANY PROBLEMS ON THIS QUESTIONNAIRE, HOW DIFFICULT HAVE THESE PROBLEMS MADE IT FOR YOU TO DO YOUR WORK, TAKE CARE OF THINGS AT HOME, OR GET ALONG WITH OTHER PEOPLE: NOT DIFFICULT AT ALL
3. WORRYING TOO MUCH ABOUT DIFFERENT THINGS: NOT AT ALL
GAD7 TOTAL SCORE: 0
6. BECOMING EASILY ANNOYED OR IRRITABLE: NOT AT ALL
1. FEELING NERVOUS, ANXIOUS, OR ON EDGE: NOT AT ALL
2. NOT BEING ABLE TO STOP OR CONTROL WORRYING: NOT AT ALL
7. FEELING AFRAID AS IF SOMETHING AWFUL MIGHT HAPPEN: NOT AT ALL
5. BEING SO RESTLESS THAT IT IS HARD TO SIT STILL: NOT AT ALL

## 2024-02-07 ASSESSMENT — PATIENT HEALTH QUESTIONNAIRE - PHQ9
5. POOR APPETITE OR OVEREATING: NOT AT ALL
SUM OF ALL RESPONSES TO PHQ QUESTIONS 1-9: 0

## 2024-02-07 NOTE — Clinical Note
Can we fax a copy of her mammo and visit notes with me to [facility]-not sure who her actual doc there is Thx AJ

## 2024-02-25 PROBLEM — E66.811 CLASS 1 OBESITY DUE TO EXCESS CALORIES WITH SERIOUS COMORBIDITY AND BODY MASS INDEX (BMI) OF 31.0 TO 31.9 IN ADULT: Status: ACTIVE | Noted: 2024-02-25

## 2024-02-25 PROBLEM — E66.09 CLASS 1 OBESITY DUE TO EXCESS CALORIES WITH SERIOUS COMORBIDITY AND BODY MASS INDEX (BMI) OF 31.0 TO 31.9 IN ADULT: Status: ACTIVE | Noted: 2024-02-25

## 2024-11-13 ENCOUNTER — HOSPITAL ENCOUNTER (OUTPATIENT)
Dept: ULTRASOUND IMAGING | Facility: CLINIC | Age: 49
Discharge: HOME | End: 2024-11-13
Attending: INTERNAL MEDICINE | Admitting: INTERNAL MEDICINE
Payer: COMMERCIAL

## 2024-11-13 DIAGNOSIS — E04.1 THYROID NODULE: ICD-10-CM

## 2024-11-13 PROCEDURE — 76536 US EXAM OF HEAD AND NECK: CPT

## 2025-03-11 RX ORDER — TIRZEPATIDE 10 MG/.5ML
INJECTION, SOLUTION SUBCUTANEOUS
COMMUNITY
Start: 2024-05-22

## 2025-03-11 NOTE — PROGRESS NOTES
Eliza is a 49 year old  female who presents for annual exam.     Besides routine health maintenance, she has no other health concerns today .    HPI:  The patient's PCP is [facility].      GYNECOLOGIC HISTORY:    No LMP recorded.  Regular menses? no  Menses every 26-46 days  Length of menses: 4-5 days  Her current contraception method is: vasectomy.  She  reports that she has never smoked. She has never used smokeless tobacco.  Patient is sexually active.  STD testing offered?  Declined    Last PHQ-9 score on record =       3/19/2025     1:36 PM   PHQ-9 SCORE   PHQ-9 Total Score 0     Last GAD7 score on record =       3/19/2025     1:36 PM   RADHA-7 SCORE   Total Score 0       HEALTH MAINTENANCE:  Cholesterol:  labs done with endocrinology  Last Mammo: One year ago, Result: Normal, Next Mammo: Today     Pap:   Lab Results   Component Value Date    GYNINTERP  2023     Negative for Intraepithelial Lesion or Malignancy (NILM), NEG-HPV    PAP NIL, NEG-HPV 2018   Colonoscopy:  23, Result: Normal, Next Colonoscopy: 10 years.  Dexa:  NA  Health maintenance reviewed. Immunizations and been reviewed with rheumatologist due to timing of getting vaccines.      HISTORY:  OB History    Para Term  AB Living   2 2 2 0 0 2   SAB IAB Ectopic Multiple Live Births   0 0 0 0 2      # Outcome Date GA Lbr Naresh/2nd Weight Sex Type Anes PTL Lv   2 Term 11/01/10 39w0d  3.402 kg (7 lb 8 oz) F Vag-Spont EPI  PITO      Birth Comments: Followed by Donna      Name: Freddie   1 Term 08 39w0d  3.459 kg (7 lb 10 oz) M Vag-Spont EPI  PITO      Birth Comments: Followed by Donna and Delivered by [facility]. severe back issues during pregnancy-SI joint injection with minimal relief done at 36 weeks      Name: Gerard       Patient Active Problem List   Diagnosis    Osteoarthritis of spine, unspecified spinal osteoarthritis complication status, unspecified spinal region    HLA-B27 positive  arthropathy    Hypercholesteremia    Nontoxic thyroid nodule    Chronic cough    Vaginal dryness    Screening for cervical cancer    Ankylosing spondylitis (H)    Class 1 obesity due to excess calories with serious comorbidity and body mass index (BMI) of 31.0 to 31.9 in adult     Past Surgical History:   Procedure Laterality Date    Bunion surgery  2003    COLONOSCOPY N/A 7/27/2023    Procedure: Colonoscopy;  Surgeon: Dakota Heredia MD;  Location:  GI    CYSTOSCOPY, SLING TRANSVAGINAL N/A 8/25/2020    Procedure: SLING OPERATION, FOR STRESS INCONTINENCE, WITHOUT ANTERIOR COLPORRHAPHY, CYSTOSCOPY;  Surgeon: Monica Thompson MD;  Location:  OR      Social History     Tobacco Use    Smoking status: Never    Smokeless tobacco: Never   Substance Use Topics    Alcohol use: Yes     Comment: 0cca      Problem (# of Occurrences) Relation (Name,Age of Onset)    Diabetes (1) Paternal Grandmother    Neurologic Disorder (1) Mother: supranucleur nerological palsy    Osteoporosis (1) Maternal Grandmother    Thyroid Disease (1) Mother    Breast Cancer (2) Mother (52): mom's dx'd at early stage, Paternal Grandmother    Hyperlipidemia (1) Father    Coronary Artery Disease (1) Paternal Grandmother    No Known Problems (5) Sister, Brother, Maternal Grandfather, Paternal Grandfather, Other           Negative family history of: Colon Cancer              Current Outpatient Medications   Medication Sig Dispense Refill    ZEPBOUND 10 MG/0.5ML prefilled pen       atorvastatin (LIPITOR) 40 MG tablet Take 40 mg by mouth daily      golimumab (SIMPONI ARIA) 50 MG/4ML injection Inject into the vein once every eight weeks      naproxen (NAPROSYN) 500 MG tablet Take 500 mg by mouth 2 times daily (with meals)      polyethylene glycol-electrolytes (NULYTELY) 420 g solution       ZEPBOUND 2.5 MG/0.5ML prefilled pen ADMINISTER 2.5 MG UNDER THE SKIN 1 TIME WEEKLY      ZEPBOUND 5 MG/0.5ML prefilled pen INJECT 5 MG SUBCUTANEOUSLY ONCE WEEKLY       "ZEPBOUND 7.5 MG/0.5ML prefilled pen ADMINISTER 7.5 MG UNDER THE SKIN 1 TIME WEEKLY FOR 4 WEEKS       No current facility-administered medications for this visit.     No Known Allergies    Past medical, surgical, social and family histories were reviewed and updated in EPIC.    EXAM:  There were no vitals taken for this visit.   BMI: There is no height or weight on file to calculate BMI.    PHYSICAL EXAM:  Constitutional:   Appearance: Well nourished, well developed, alert, in no acute distress  Neck:  Lymph Nodes:  No lymphadenopathy present    Thyroid:  Gland size normal, nontender, no nodules or masses present  on palpation  Chest:  Respiratory Effort:  Breathing unlabored  Cardiovascular:    Heart: Auscultation:  Regular rate, normal rhythm, no murmurs present  Breasts: {[facility] Breast exam:425377}  Gastrointestinal:   Abdominal Examination:  Abdomen nontender to palpation, tone normal without rigidity or guarding, no masses present, umbilicus without lesions   Liver and Spleen:  No hepatomegaly present, liver nontender to palpation    Hernias:  No hernias present  Lymphatic: Lymph Nodes:  No other lymphadenopathy present  Skin:  General Inspection:  No rashes present, no lesions present, no areas of  discoloration  Neurologic:    Mental Status:  Oriented X3.  Normal strength and tone, sensory exam                grossly normal, mentation intact and speech normal.    Psychiatric:   Mentation appears normal and affect normal/bright.         {[facility] Pelvic Exam:253471}    COUNSELING:   {FEMALE COUNSELING MESSAGES:442544::\"Reviewed preventive health counseling, as reflected in patient instructions\"}    BMI: There is no height or weight on file to calculate BMI.  {Obesity Action Plan -- Delete if BMA < 25:773185}    ASSESSMENT:  49 year old female with satisfactory annual exam.    ICD-10-CM    1. Well woman exam with routine gynecological exam  Z01.419           PLAN:  ***    Monica Thompson MD    " without rigidity or guarding, no masses present, umbilicus without lesions   Liver and Spleen:  No hepatomegaly present, liver nontender to palpation    Hernias:  No hernias present  Lymphatic: Lymph Nodes:  No other lymphadenopathy present  Skin:  General Inspection:  No rashes present, no lesions present, no areas of  discoloration  Neurologic:    Mental Status:  Oriented X3.  Normal strength and tone, sensory exam                grossly normal, mentation intact and speech normal.    Psychiatric:   Mentation appears normal and affect normal/bright.         Pelvic Exam:  External Genitalia:     Normal appearance for age, no discharge present, no tenderness present, no inflammatory lesions present, color normal  Vagina:     Normal vaginal vault without central or paravaginal defects, no discharge present, no inflammatory lesions present, no masses present  Bladder:     Nontender to palpation  Urethra:   Urethral Body:  Urethra palpation normal, urethra structural support normal   Urethral Meatus:  No erythema or lesions present  Cervix:     Appearance healthy, no lesions present, nontender to palpation, no bleeding present  Uterus:     Uterus: firm, normal sized and nontender, midplane in position.   Adnexa:     No adnexal tenderness present, no adnexal masses present  Perineum:     Perineum within normal limits, no evidence of trauma, no rashes or skin lesions present  Anus:     Anus within normal limits, no hemorrhoids present  Inguinal Lymph Nodes:     No lymphadenopathy present  Pubic Hair:     Normal pubic hair distribution for age  Genitalia and Groin:     No rashes present, no lesions present, no areas of discoloration, no masses present    COUNSELING:   Reviewed preventive health counseling, as reflected in patient instructions  Special attention given to:        Regular exercise       Healthy diet/nutrition       Immunizations  Declined: Covid-19 and TDAP due to Conscientious objector              (Mattie)menopause management    BMI: Body mass index is 24.45 kg/m .      ASSESSMENT:  49 year old female with satisfactory annual exam.    ICD-10-CM    1. Encounter for gynecological examination without abnormal finding  Z01.419       2. HLA-B27 positive arthropathy  M12.80       3. Ankylosing spondylitis of multiple sites in spine (H)  M45.0       4. Hypercholesteremia  E78.00       5. Irregular menses  N92.6           PLAN:  Pap is UTD for 1 more year.   Can follow routine ASCCP guidelines but doing pap q3 yrs given she is immune suppressed    Mammo today      Fasting labs and management of her other health conditions is deferred to her other providers     Menses are irregular and has some v.m sx and other perimenopausal sx but nothing that is bothering her enough to consider HRT or other interventions    Reviewed expectations in the coming years, options for HRT or other interventions that aren't hormonal should the need arise      Monica Thompson MD

## 2025-03-19 ENCOUNTER — OFFICE VISIT (OUTPATIENT)
Dept: OBGYN | Facility: CLINIC | Age: 50
End: 2025-03-19
Payer: COMMERCIAL

## 2025-03-19 ENCOUNTER — ANCILLARY PROCEDURE (OUTPATIENT)
Dept: MAMMOGRAPHY | Facility: CLINIC | Age: 50
End: 2025-03-19
Payer: COMMERCIAL

## 2025-03-19 VITALS
DIASTOLIC BLOOD PRESSURE: 60 MMHG | SYSTOLIC BLOOD PRESSURE: 102 MMHG | BODY MASS INDEX: 24.45 KG/M2 | WEIGHT: 138 LBS | HEIGHT: 63 IN

## 2025-03-19 DIAGNOSIS — E78.00 HYPERCHOLESTEREMIA: ICD-10-CM

## 2025-03-19 DIAGNOSIS — M45.0 ANKYLOSING SPONDYLITIS OF MULTIPLE SITES IN SPINE (H): ICD-10-CM

## 2025-03-19 DIAGNOSIS — M12.80 HLA-B27 POSITIVE ARTHROPATHY: ICD-10-CM

## 2025-03-19 DIAGNOSIS — N92.6 IRREGULAR MENSES: ICD-10-CM

## 2025-03-19 DIAGNOSIS — Z01.419 WELL WOMAN EXAM WITH ROUTINE GYNECOLOGICAL EXAM: Primary | ICD-10-CM

## 2025-03-19 DIAGNOSIS — Z12.31 VISIT FOR SCREENING MAMMOGRAM: ICD-10-CM

## 2025-03-19 PROCEDURE — 77067 SCR MAMMO BI INCL CAD: CPT | Mod: TC | Performed by: RADIOLOGY

## 2025-03-19 PROCEDURE — 3074F SYST BP LT 130 MM HG: CPT | Performed by: OBSTETRICS & GYNECOLOGY

## 2025-03-19 PROCEDURE — 77063 BREAST TOMOSYNTHESIS BI: CPT | Mod: TC | Performed by: RADIOLOGY

## 2025-03-19 PROCEDURE — 99396 PREV VISIT EST AGE 40-64: CPT | Performed by: OBSTETRICS & GYNECOLOGY

## 2025-03-19 PROCEDURE — 3078F DIAST BP <80 MM HG: CPT | Performed by: OBSTETRICS & GYNECOLOGY

## 2025-03-19 ASSESSMENT — ANXIETY QUESTIONNAIRES
GAD7 TOTAL SCORE: 0
5. BEING SO RESTLESS THAT IT IS HARD TO SIT STILL: NOT AT ALL
3. WORRYING TOO MUCH ABOUT DIFFERENT THINGS: NOT AT ALL
IF YOU CHECKED OFF ANY PROBLEMS ON THIS QUESTIONNAIRE, HOW DIFFICULT HAVE THESE PROBLEMS MADE IT FOR YOU TO DO YOUR WORK, TAKE CARE OF THINGS AT HOME, OR GET ALONG WITH OTHER PEOPLE: NOT DIFFICULT AT ALL
7. FEELING AFRAID AS IF SOMETHING AWFUL MIGHT HAPPEN: NOT AT ALL
2. NOT BEING ABLE TO STOP OR CONTROL WORRYING: NOT AT ALL
GAD7 TOTAL SCORE: 0
6. BECOMING EASILY ANNOYED OR IRRITABLE: NOT AT ALL
1. FEELING NERVOUS, ANXIOUS, OR ON EDGE: NOT AT ALL

## 2025-03-19 ASSESSMENT — PATIENT HEALTH QUESTIONNAIRE - PHQ9
SUM OF ALL RESPONSES TO PHQ QUESTIONS 1-9: 0
5. POOR APPETITE OR OVEREATING: NOT AT ALL

## 2025-04-29 PROBLEM — N92.6 IRREGULAR MENSES: Status: ACTIVE | Noted: 2025-04-29

## (undated) DEVICE — LINEN TOWEL PACK X5 5464

## (undated) DEVICE — NDL 22GA 1.5"

## (undated) DEVICE — PACK TVT HYSTEROSCOPY SMA15HYFSE

## (undated) DEVICE — SOL WATER IRRIG 3000ML BAG 2B7117

## (undated) DEVICE — SU VICRYL 3-0 SH 27" J316H

## (undated) DEVICE — NDL COUNTER 10CT

## (undated) DEVICE — MANIFOLD NEPTUNE 4 PORT 700-20

## (undated) DEVICE — KIT ENDO TURNOVER/PROCEDURE W/CLEAN A SCOPE LINERS 103888

## (undated) DEVICE — TUBING IRRIG CYSTO/BLADDER SET 81" LF 2C4040

## (undated) DEVICE — GLOVE PROTEXIS BLUE W/NEU-THERA 7.0  2D73EB70

## (undated) DEVICE — BLADE KNIFE SURG 11 371111

## (undated) DEVICE — SOL WATER IRRIG 1000ML BOTTLE 2F7114

## (undated) RX ORDER — PHENAZOPYRIDINE HYDROCHLORIDE 200 MG/1
TABLET, FILM COATED ORAL
Status: DISPENSED
Start: 2020-08-25

## (undated) RX ORDER — FENTANYL CITRATE 50 UG/ML
INJECTION, SOLUTION INTRAMUSCULAR; INTRAVENOUS
Status: DISPENSED
Start: 2023-07-27